# Patient Record
Sex: FEMALE | Race: WHITE | Employment: PART TIME | URBAN - METROPOLITAN AREA
[De-identification: names, ages, dates, MRNs, and addresses within clinical notes are randomized per-mention and may not be internally consistent; named-entity substitution may affect disease eponyms.]

---

## 2017-04-27 ENCOUNTER — TELEPHONE (OUTPATIENT)
Dept: SURGERY | Facility: CLINIC | Age: 62
End: 2017-04-27

## 2017-04-27 NOTE — TELEPHONE ENCOUNTER
Pt on prilosec 40mg BID and wants to know if something stronger is available. Informed pt that there is not, does not wish to try Nexium, but will try taking tums along with the prilosec.

## 2017-06-28 ENCOUNTER — TELEPHONE (OUTPATIENT)
Dept: HEMATOLOGY/ONCOLOGY | Facility: HOSPITAL | Age: 62
End: 2017-06-28

## 2017-06-28 NOTE — TELEPHONE ENCOUNTER
Patient states she is really nervous about her  possibly losing his job. This is not for certain. Advised patient to discuss with  about this.  If he is going to lose insurance coverage, will discuss with Dr Cyrus Ahuja regarding moving up appt and

## 2017-06-30 ENCOUNTER — TELEPHONE (OUTPATIENT)
Dept: HEMATOLOGY/ONCOLOGY | Facility: HOSPITAL | Age: 62
End: 2017-06-30

## 2017-06-30 DIAGNOSIS — K86.2 PANCREAS CYST: Primary | ICD-10-CM

## 2017-07-17 ENCOUNTER — TELEPHONE (OUTPATIENT)
Dept: SURGERY | Facility: CLINIC | Age: 62
End: 2017-07-17

## 2017-07-17 RX ORDER — OMEPRAZOLE 20 MG/1
20 CAPSULE, DELAYED RELEASE ORAL 2 TIMES DAILY
Qty: 60 CAPSULE | Refills: 6 | Status: SHIPPED | OUTPATIENT
Start: 2017-07-17 | End: 2017-07-27

## 2017-07-19 ENCOUNTER — NURSE ONLY (OUTPATIENT)
Dept: HEMATOLOGY/ONCOLOGY | Facility: HOSPITAL | Age: 62
End: 2017-07-19

## 2017-07-19 NOTE — PROGRESS NOTES
Requested CD on MRI of the abdomen done at [facility]. Spoke to [place]. Sent fax with info where CD needs to be mailed to, [phone].

## 2017-07-20 ENCOUNTER — OFFICE VISIT (OUTPATIENT)
Dept: HEMATOLOGY/ONCOLOGY | Age: 62
End: 2017-07-20
Attending: INTERNAL MEDICINE
Payer: COMMERCIAL

## 2017-07-20 VITALS
DIASTOLIC BLOOD PRESSURE: 76 MMHG | TEMPERATURE: 98 F | OXYGEN SATURATION: 99 % | HEART RATE: 74 BPM | RESPIRATION RATE: 16 BRPM | SYSTOLIC BLOOD PRESSURE: 111 MMHG

## 2017-07-20 DIAGNOSIS — Z85.3 HISTORY OF BREAST CANCER: Primary | ICD-10-CM

## 2017-07-20 DIAGNOSIS — K86.2 PANCREATIC CYST: ICD-10-CM

## 2017-07-20 PROCEDURE — 99213 OFFICE O/P EST LOW 20 MIN: CPT | Performed by: INTERNAL MEDICINE

## 2017-07-20 RX ORDER — FLUTICASONE PROPIONATE 50 MCG
SPRAY, SUSPENSION (ML) NASAL
Refills: 5 | COMMUNITY
Start: 2017-07-02

## 2017-07-20 RX ORDER — FERROUS SULFATE 325(65) MG
325 TABLET ORAL
COMMUNITY
End: 2019-11-21 | Stop reason: ALTCHOICE

## 2017-07-20 RX ORDER — TRAMADOL HYDROCHLORIDE 50 MG/1
TABLET ORAL
Refills: 1 | COMMUNITY
Start: 2017-07-02 | End: 2017-10-09

## 2017-07-20 RX ORDER — OMEPRAZOLE 40 MG/1
CAPSULE, DELAYED RELEASE ORAL
Refills: 6 | COMMUNITY
Start: 2017-07-17 | End: 2017-10-09

## 2017-07-20 RX ORDER — NABUMETONE 750 MG/1
TABLET, FILM COATED ORAL
Refills: 1 | COMMUNITY
Start: 2017-07-13 | End: 2017-10-09

## 2017-07-20 NOTE — PROGRESS NOTES
Patient is here for MD f/u for breast cancer. Pt had an MRI of the abdomen on July 12. C/o left hip pain d/t bursitis. Pt started on Iron as ordered by PCP. Appetite and energy level is good.          Education Record    Learner:  Patient    Disease / Ruth Hedrick

## 2017-07-21 NOTE — PROGRESS NOTES
[facility]    PATIENT'S NAME: Marjorie Salas   ATTENDING PHYSICIAN: Ban Zurita M.D.    PATIENT ACCOUNT #: [de-identified] LOCATION: [address] [place] [address] RECORD #: [id number] YOB: 1955   DATE OF SERVICE: 07/20/2017       CANCER C status is 0. Her weight is 102 pounds. Blood pressure is 111/76, pulse 74, respiratory rate 20, temperature 98. 2. HEENT:  Unremarkable. She has pink conjunctivae, anicteric sclerae. Pharynx without lesions.   LYMPHATICS:  No cervical, supraclavicular,

## 2017-07-28 ENCOUNTER — APPOINTMENT (OUTPATIENT)
Dept: LAB | Age: 62
End: 2017-07-28
Payer: COMMERCIAL

## 2017-07-28 DIAGNOSIS — K86.2 PANCREATIC CYST: ICD-10-CM

## 2017-07-28 LAB
CHLORIDE: 108 MMOL/L (ref 101–111)
CO2: 25 MMOL/L (ref 22–32)
POTASSIUM SERPL-SCNC: 4.1 MMOL/L (ref 3.6–5.1)
SODIUM SERPL-SCNC: 142 MMOL/L (ref 136–144)

## 2017-07-28 PROCEDURE — 36415 COLL VENOUS BLD VENIPUNCTURE: CPT

## 2017-07-28 PROCEDURE — 80051 ELECTROLYTE PANEL: CPT

## 2017-08-08 ENCOUNTER — ANESTHESIA EVENT (OUTPATIENT)
Dept: ENDOSCOPY | Facility: HOSPITAL | Age: 62
End: 2017-08-08
Payer: COMMERCIAL

## 2017-08-09 ENCOUNTER — SURGERY (OUTPATIENT)
Age: 62
End: 2017-08-09

## 2017-08-09 ENCOUNTER — ANESTHESIA (OUTPATIENT)
Dept: ENDOSCOPY | Facility: HOSPITAL | Age: 62
End: 2017-08-09
Payer: COMMERCIAL

## 2017-08-09 ENCOUNTER — HOSPITAL ENCOUNTER (OUTPATIENT)
Facility: HOSPITAL | Age: 62
Setting detail: HOSPITAL OUTPATIENT SURGERY
Discharge: HOME | End: 2017-08-09
Attending: INTERNAL MEDICINE | Admitting: INTERNAL MEDICINE
Payer: COMMERCIAL

## 2017-08-09 VITALS
OXYGEN SATURATION: 97 % | DIASTOLIC BLOOD PRESSURE: 78 MMHG | SYSTOLIC BLOOD PRESSURE: 114 MMHG | HEART RATE: 58 BPM | WEIGHT: 104 LBS | BODY MASS INDEX: 19.14 KG/M2 | TEMPERATURE: 98 F | HEIGHT: 62 IN | RESPIRATION RATE: 16 BRPM

## 2017-08-09 DIAGNOSIS — K86.2 PANCREATIC CYST: Primary | ICD-10-CM

## 2017-08-09 PROCEDURE — 82150 ASSAY OF AMYLASE: CPT | Performed by: INTERNAL MEDICINE

## 2017-08-09 PROCEDURE — 82378 CARCINOEMBRYONIC ANTIGEN: CPT | Performed by: INTERNAL MEDICINE

## 2017-08-09 PROCEDURE — 88173 CYTOPATH EVAL FNA REPORT: CPT | Performed by: INTERNAL MEDICINE

## 2017-08-09 PROCEDURE — 0D988ZX DRAINAGE OF SMALL INTESTINE, VIA NATURAL OR ARTIFICIAL OPENING ENDOSCOPIC, DIAGNOSTIC: ICD-10-PCS | Performed by: INTERNAL MEDICINE

## 2017-08-09 RX ORDER — SODIUM CHLORIDE, SODIUM LACTATE, POTASSIUM CHLORIDE, CALCIUM CHLORIDE 600; 310; 30; 20 MG/100ML; MG/100ML; MG/100ML; MG/100ML
INJECTION, SOLUTION INTRAVENOUS CONTINUOUS
Status: DISCONTINUED | OUTPATIENT
Start: 2017-08-09 | End: 2017-08-09

## 2017-08-09 RX ORDER — CIPROFLOXACIN 2 MG/ML
400 INJECTION, SOLUTION INTRAVENOUS ONCE
Status: DISCONTINUED | OUTPATIENT
Start: 2017-08-09 | End: 2017-08-09

## 2017-08-09 RX ORDER — LEVOFLOXACIN 5 MG/ML
INJECTION, SOLUTION INTRAVENOUS
Status: DISCONTINUED
Start: 2017-08-09 | End: 2017-08-09

## 2017-08-09 RX ORDER — NALOXONE HYDROCHLORIDE 0.4 MG/ML
80 INJECTION, SOLUTION INTRAMUSCULAR; INTRAVENOUS; SUBCUTANEOUS AS NEEDED
Status: DISCONTINUED | OUTPATIENT
Start: 2017-08-09 | End: 2017-08-09

## 2017-08-09 RX ORDER — TURMERIC ROOT EXTRACT 500 MG
TABLET ORAL DAILY
COMMUNITY

## 2017-08-09 NOTE — INTERVAL H&P NOTE
Pre-op Diagnosis: PANCREATIC CYST    The above referenced H&P was reviewed by Mitchel Marcano DO on 8/9/2017, the patient was examined and no significant changes have occurred in the patient's condition since the H&P was performed.   I discussed with the p

## 2017-08-09 NOTE — ANESTHESIA POSTPROCEDURE EVALUATION
[address] Patient Status:  Hospital Outpatient Surgery   Age/Gender 64year old female MRN [id number]   [facility] ENDOSCOPY Attending 3 Titi Berkowitz, [address] Day # 0 PCP GUILLE NAJERA       Anesthesia Post-op No

## 2017-08-09 NOTE — ANESTHESIA PREPROCEDURE EVALUATION
PRE-OP EVALUATION    Patient Name: Kristy Bright    Pre-op Diagnosis: PANCREATIC CYST    Procedure(s):  ENDOSCOPIC ULTRASOUND     Surgeon(s) and Role:     * Basil Raya, DO - Primary    Pre-op vitals reviewed.   Temp: 97.6 °F (36.4 °C)  Pulse: 52 use Yes    Comment: rarely, 4x/year       Drug use: No     Available pre-op labs reviewed.        Lab Results  Component Value Date    07/28/2017   K 4.1 07/28/2017    07/28/2017   CO2 25.0 07/28/2017            Airway      Mallampati: II  Mouth

## 2017-08-09 NOTE — OPERATIVE REPORT
`      Upper Endoscopic Ultrasound with Fine Needle Aspiration    Hernando Alexander Patient Status:  Hospital Outpatient Surgery    1955 MRN [id number]   [facility] ENDOSCOPY Attending Arthur Bravo,    Hosp Day # 0 PCP SHEREE condition. ENDOSONOGRAPHIC FINDINGS:    The exam began with a linear echoendoscope. The parenchyma of the pancreas revealed hyperechoic stranding and lobularity suggestive of chronic pancreatitis.  A 7 mm anechoic cyst was visualized in the uncinate pr

## 2017-10-04 ENCOUNTER — APPOINTMENT (OUTPATIENT)
Dept: LAB | Age: 62
End: 2017-10-04
Attending: INTERNAL MEDICINE
Payer: COMMERCIAL

## 2017-10-04 DIAGNOSIS — K86.2 PANCREAS CYST: ICD-10-CM

## 2017-10-04 PROCEDURE — 36415 COLL VENOUS BLD VENIPUNCTURE: CPT

## 2017-10-04 PROCEDURE — 80076 HEPATIC FUNCTION PANEL: CPT

## 2017-10-09 ENCOUNTER — OFFICE VISIT (OUTPATIENT)
Dept: SURGERY | Facility: CLINIC | Age: 62
End: 2017-10-09

## 2017-10-09 VITALS
HEIGHT: 62.5 IN | DIASTOLIC BLOOD PRESSURE: 68 MMHG | WEIGHT: 104 LBS | SYSTOLIC BLOOD PRESSURE: 100 MMHG | RESPIRATION RATE: 16 BRPM | BODY MASS INDEX: 18.66 KG/M2 | HEART RATE: 68 BPM

## 2017-10-09 DIAGNOSIS — K21.9 GASTROESOPHAGEAL REFLUX DISEASE WITHOUT ESOPHAGITIS: Primary | ICD-10-CM

## 2017-10-09 PROCEDURE — 99213 OFFICE O/P EST LOW 20 MIN: CPT | Performed by: SURGERY

## 2017-10-09 RX ORDER — OMEPRAZOLE 40 MG/1
40 CAPSULE, DELAYED RELEASE ORAL DAILY
Qty: 60 CAPSULE | Refills: 6 | Status: ON HOLD | OUTPATIENT
Start: 2017-10-09 | End: 2019-09-18

## 2017-10-09 NOTE — PROGRESS NOTES
Follow Up Visit Note       Active Problems      1. Gastroesophageal reflux disease without esophagitis          Chief Complaint   Patient presents with:  Gastro-esophageal Reflux: 1 year f/u        History of Present Illness    Doing well.   Reports very ra IEN D Disp:  Rfl: 5   Ferrous Sulfate 325 (65 Fe) MG Oral Tab Take 325 mg by mouth 2 (two) times daily. Disp:  Rfl:    Potassium Chloride ER (K-DUR M20) 20 MEQ Oral Tab CR Take 20 mEq by mouth 2 (two) times daily.  Disp:  Rfl:    GuaiFENesin ER ([address]) 60 reviewed. Assessment   Gastroesophageal reflux disease without esophagitis  (primary encounter diagnosis)    Pt with GERD. Symptom free on bid PPI (omeprazole). Plan   · Lengthy discussion with the patient.   · Recommend patient change to alda

## 2019-02-12 ENCOUNTER — OFFICE VISIT (OUTPATIENT)
Dept: SURGERY | Facility: CLINIC | Age: 64
End: 2019-02-12
Payer: COMMERCIAL

## 2019-02-12 VITALS
TEMPERATURE: 97 F | DIASTOLIC BLOOD PRESSURE: 63 MMHG | WEIGHT: 101 LBS | HEIGHT: 62.5 IN | BODY MASS INDEX: 18.12 KG/M2 | HEART RATE: 56 BPM | SYSTOLIC BLOOD PRESSURE: 109 MMHG

## 2019-02-12 VITALS
BODY MASS INDEX: 18.12 KG/M2 | DIASTOLIC BLOOD PRESSURE: 63 MMHG | HEIGHT: 62.5 IN | SYSTOLIC BLOOD PRESSURE: 109 MMHG | HEART RATE: 56 BPM | WEIGHT: 101 LBS | TEMPERATURE: 97 F

## 2019-02-12 DIAGNOSIS — I42.8 NONISCHEMIC CARDIOMYOPATHY (HCC): ICD-10-CM

## 2019-02-12 DIAGNOSIS — K21.9 GASTROESOPHAGEAL REFLUX DISEASE WITHOUT ESOPHAGITIS: ICD-10-CM

## 2019-02-12 DIAGNOSIS — K86.2 PANCREATIC CYST: ICD-10-CM

## 2019-02-12 DIAGNOSIS — K62.3 RECTAL PROLAPSE: Primary | ICD-10-CM

## 2019-02-12 PROCEDURE — 99215 OFFICE O/P EST HI 40 MIN: CPT | Performed by: COLON & RECTAL SURGERY

## 2019-02-12 PROCEDURE — 99213 OFFICE O/P EST LOW 20 MIN: CPT | Performed by: SURGERY

## 2019-02-12 NOTE — H&P
New Patient Visit Note       Active Problems      No diagnosis found. Chief Complaint   Patient presents with:  Anal Problem (GI): [facility] PT ref by [facility] for prolapsed rectum.  c/o of bleeding and PN for several days/      History of Present Illness Take 100 mg by mouth daily. , Disp: , Rfl:   •  Cyanocobalamin (VITAMIN B-12 OR), Take by mouth daily. , Disp: , Rfl:   •  Omeprazole 40 MG Oral Capsule Delayed Release, Take 1 capsule (40 mg total) by mouth daily. , Disp: 60 capsule, Rfl: 6  •  Turmeric 500 Up  No Follow-up on file.     Sheba Muñiz MD

## 2019-02-12 NOTE — H&P
New Patient Visit Note       Active Problems      1. Rectal prolapse        Chief Complaint   Patient presents with:  Anal / Rectal Problem: NEW PT, REFERRED BY  [address] PT C/O BLEEDING SINCE YESTERDAY AND PAIN.       History of Present Illnes Tartrate 25 MG Oral Tab, Take 25 mg by mouth 2 (two) times daily. , Disp: , Rfl:   •  aspirin 81 MG Oral Tab, Take 81 mg by mouth daily. , Disp: , Rfl:   •  losartan 100 MG Oral Tab, Take 100 mg by mouth daily. , Disp: , Rfl:   •  Cyanocobalamin (VITAMIN B-12 (36.3 °C) (Oral)   Ht 62.5\"   Wt 101 lb   BMI 18.18 kg/m²   Physical Exam   Constitutional: She appears well-developed and well-nourished. No distress. Cardiovascular: Normal rate and regular rhythm.    Pulmonary/Chest: Effort normal. No respiratory dist

## 2019-02-13 NOTE — PATIENT INSTRUCTIONS
Assessment   Rectal prolapse  (primary encounter diagnosis)  Gastroesophageal reflux disease without esophagitis  Pancreatic cyst      Plan   The patient has a full thickness rectal prolapse. This was reduced at the bedside.  There was no ischemia or tissue

## 2019-02-13 NOTE — H&P
New Patient Visit Note       Active Problems      1. Rectal prolapse    2. Gastroesophageal reflux disease without esophagitis    3.  Pancreatic cyst        Chief Complaint   Patient presents with:  Anal Problem (GI): anal pain and bleeding      History of have a cystocele. The patient does have prolapse of the vagina or cervix.     The patient states she has been recommended to have her vaginal prolapse repaired, but she has put this off as it does not bother her, and as she is trying to improve her cardiac on File Prior to Visit:  metoprolol Tartrate 25 MG Oral Tab Take 25 mg by mouth 2 (two) times daily. aspirin 81 MG Oral Tab Take 81 mg by mouth daily. losartan 100 MG Oral Tab Take 100 mg by mouth daily.    Cyanocobalamin (VITAMIN B-12 OR) Take by mouth appears well-developed and well-nourished. No distress. HENT:   Head: Normocephalic and atraumatic. Nose: Nose normal.   Eyes: EOM are normal. Pupils are equal, round, and reactive to light. No scleral icterus. Neck: Normal range of motion.    Cardiov her seemingly significant organ prolapse via the vagina, concurrent anterior and posterior repair should be considered. This is something that could theoretically be arranged here at THE [facility], however I also suggest an opinion from a higher volume center.  I

## 2019-02-25 ENCOUNTER — HOSPITAL ENCOUNTER (OUTPATIENT)
Dept: MRI IMAGING | Facility: HOSPITAL | Age: 64
Discharge: HOME | End: 2019-02-25
Attending: INTERNAL MEDICINE
Payer: COMMERCIAL

## 2019-02-25 DIAGNOSIS — K86.2 PANCREAS CYST: ICD-10-CM

## 2019-02-25 PROCEDURE — 74183 MRI ABD W/O CNTR FLWD CNTR: CPT | Performed by: INTERNAL MEDICINE

## 2019-02-25 PROCEDURE — A9575 INJ GADOTERATE MEGLUMI 0.1ML: HCPCS

## 2019-03-12 ENCOUNTER — LAB ENCOUNTER (OUTPATIENT)
Dept: LAB | Age: 64
End: 2019-03-12
Attending: INTERNAL MEDICINE
Payer: COMMERCIAL

## 2019-03-12 DIAGNOSIS — R14.0 ABDOMINAL BLOATING: ICD-10-CM

## 2019-03-12 LAB
ALBUMIN SERPL-MCNC: 3.1 G/DL (ref 3.4–5)
ALBUMIN/GLOB SERPL: 1.4 {RATIO} (ref 1–2)
ALP LIVER SERPL-CCNC: 45 U/L (ref 50–130)
ALT SERPL-CCNC: 27 U/L (ref 13–56)
ANION GAP SERPL CALC-SCNC: 6 MMOL/L (ref 0–18)
AST SERPL-CCNC: 22 U/L (ref 15–37)
BASOPHILS # BLD AUTO: 0.02 X10(3) UL (ref 0–0.2)
BASOPHILS NFR BLD AUTO: 0.3 %
BILIRUB SERPL-MCNC: 0.4 MG/DL (ref 0.1–2)
BUN BLD-MCNC: 27 MG/DL (ref 7–18)
BUN/CREAT SERPL: 36 (ref 10–20)
CALCIUM BLD-MCNC: 8.7 MG/DL (ref 8.5–10.1)
CHLORIDE SERPL-SCNC: 109 MMOL/L (ref 98–107)
CO2 SERPL-SCNC: 27 MMOL/L (ref 21–32)
CREAT BLD-MCNC: 0.75 MG/DL (ref 0.55–1.02)
CRP SERPL-MCNC: <0.29 MG/DL (ref ?–0.3)
DEPRECATED RDW RBC AUTO: 48.4 FL (ref 35.1–46.3)
EOSINOPHIL # BLD AUTO: 0.31 X10(3) UL (ref 0–0.7)
EOSINOPHIL NFR BLD AUTO: 4.1 %
ERYTHROCYTE [DISTWIDTH] IN BLOOD BY AUTOMATED COUNT: 13.5 % (ref 11–15)
GLOBULIN PLAS-MCNC: 2.2 G/DL (ref 2.8–4.4)
GLUCOSE BLD-MCNC: 103 MG/DL (ref 70–99)
HCT VFR BLD AUTO: 33.1 % (ref 35–48)
HGB BLD-MCNC: 10.5 G/DL (ref 12–16)
IMM GRANULOCYTES # BLD AUTO: 0.04 X10(3) UL (ref 0–1)
IMM GRANULOCYTES NFR BLD: 0.5 %
LIPASE SERPL-CCNC: 135 U/L (ref 73–393)
LYMPHOCYTES # BLD AUTO: 1.4 X10(3) UL (ref 1–4)
LYMPHOCYTES NFR BLD AUTO: 18.7 %
M PROTEIN MFR SERPL ELPH: 5.3 G/DL (ref 6.4–8.2)
MCH RBC QN AUTO: 30.8 PG (ref 26–34)
MCHC RBC AUTO-ENTMCNC: 31.7 G/DL (ref 31–37)
MCV RBC AUTO: 97.1 FL (ref 80–100)
MONOCYTES # BLD AUTO: 0.57 X10(3) UL (ref 0.1–1)
MONOCYTES NFR BLD AUTO: 7.6 %
NEUTROPHILS # BLD AUTO: 5.14 X10 (3) UL (ref 1.5–7.7)
NEUTROPHILS # BLD AUTO: 5.14 X10(3) UL (ref 1.5–7.7)
NEUTROPHILS NFR BLD AUTO: 68.8 %
OSMOLALITY SERPL CALC.SUM OF ELEC: 299 MOSM/KG (ref 275–295)
PLATELET # BLD AUTO: 223 10(3)UL (ref 150–450)
POTASSIUM SERPL-SCNC: 4.2 MMOL/L (ref 3.5–5.1)
RBC # BLD AUTO: 3.41 X10(6)UL (ref 3.8–5.3)
SODIUM SERPL-SCNC: 142 MMOL/L (ref 136–145)
WBC # BLD AUTO: 7.5 X10(3) UL (ref 4–11)

## 2019-03-12 PROCEDURE — 36415 COLL VENOUS BLD VENIPUNCTURE: CPT

## 2019-03-12 PROCEDURE — 85025 COMPLETE CBC W/AUTO DIFF WBC: CPT

## 2019-03-12 PROCEDURE — 80053 COMPREHEN METABOLIC PANEL: CPT

## 2019-03-12 PROCEDURE — 86140 C-REACTIVE PROTEIN: CPT

## 2019-03-12 PROCEDURE — 83690 ASSAY OF LIPASE: CPT

## 2019-03-25 ENCOUNTER — APPOINTMENT (OUTPATIENT)
Dept: LAB | Age: 64
End: 2019-03-25
Attending: INTERNAL MEDICINE
Payer: COMMERCIAL

## 2019-03-25 DIAGNOSIS — R10.31 BILATERAL LOWER ABDOMINAL DISCOMFORT: ICD-10-CM

## 2019-03-25 DIAGNOSIS — R10.32 BILATERAL LOWER ABDOMINAL DISCOMFORT: ICD-10-CM

## 2019-03-25 DIAGNOSIS — D50.8 OTHER IRON DEFICIENCY ANEMIA: ICD-10-CM

## 2019-03-25 LAB
DEPRECATED HBV CORE AB SER IA-ACNC: 18.6 NG/ML (ref 18–340)
IRON SATURATION: 45 % (ref 15–50)
IRON SERPL-MCNC: 181 UG/DL (ref 50–170)
TOTAL IRON BINDING CAPACITY: 402 UG/DL (ref 240–450)
TRANSFERRIN SERPL-MCNC: 270 MG/DL (ref 200–360)

## 2019-03-25 PROCEDURE — 83540 ASSAY OF IRON: CPT

## 2019-03-25 PROCEDURE — 86304 IMMUNOASSAY TUMOR CA 125: CPT

## 2019-03-25 PROCEDURE — 82728 ASSAY OF FERRITIN: CPT

## 2019-03-25 PROCEDURE — 86301 IMMUNOASSAY TUMOR CA 19-9: CPT

## 2019-03-25 PROCEDURE — 82378 CARCINOEMBRYONIC ANTIGEN: CPT

## 2019-03-25 PROCEDURE — 83550 IRON BINDING TEST: CPT

## 2019-03-25 PROCEDURE — 36415 COLL VENOUS BLD VENIPUNCTURE: CPT

## 2019-03-26 LAB
CANCER AG125 SERPL-ACNC: 7.4 U/ML (ref ?–35)
CANCER AG19-9 SERPL-ACNC: 14.6 U/ML (ref ?–37)
CEA SERPL-MCNC: 1.7 NG/ML (ref ?–5)

## 2019-07-29 ENCOUNTER — TELEPHONE (OUTPATIENT)
Dept: HEMATOLOGY/ONCOLOGY | Facility: HOSPITAL | Age: 64
End: 2019-07-29

## 2019-08-01 ENCOUNTER — OFFICE VISIT (OUTPATIENT)
Dept: HEMATOLOGY/ONCOLOGY | Age: 64
End: 2019-08-01
Attending: INTERNAL MEDICINE
Payer: COMMERCIAL

## 2019-08-01 VITALS
DIASTOLIC BLOOD PRESSURE: 61 MMHG | SYSTOLIC BLOOD PRESSURE: 107 MMHG | WEIGHT: 108.13 LBS | BODY MASS INDEX: 20 KG/M2 | RESPIRATION RATE: 16 BRPM | HEART RATE: 64 BPM | OXYGEN SATURATION: 92 % | TEMPERATURE: 98 F

## 2019-08-01 DIAGNOSIS — D50.0 IRON DEFICIENCY ANEMIA DUE TO CHRONIC BLOOD LOSS: Primary | ICD-10-CM

## 2019-08-01 DIAGNOSIS — M81.0 OSTEOPOROSIS OF MULTIPLE SITES: ICD-10-CM

## 2019-08-01 DIAGNOSIS — Z85.3 HISTORY OF BREAST CANCER: ICD-10-CM

## 2019-08-01 PROCEDURE — 99214 OFFICE O/P EST MOD 30 MIN: CPT | Performed by: INTERNAL MEDICINE

## 2019-08-01 NOTE — PATIENT INSTRUCTIONS
For Dr. Nafisa Maldonado nurse line, call [phone] with any questions or concerns,  Monday through Friday 8:00 to 4:30.      After hours or weekends for urgent needs: [phone].   Central [phone]  Medical Records:   [phone]  Cancer [facility]

## 2019-08-01 NOTE — PROGRESS NOTES
Understanding a Distal Radius Fracture      A fracture is a broken bone. A fracture in the distal radius is a break in the lower end of the radius. This is the larger bone in the forearm. Because the break occurs near the wrist, it is often called a wrist fracture.    The bone may be cracked, or it may be broken into 2 or more pieces. The pieces of bone may be lined up or they may have moved out of place. Sometimes, the bone may break through the skin. Nearby nerves, tissues, and joints also may be damaged. Depending on the severity of the fracture, healing may take several months or longer.  What causes a distal radius fracture?  This type of fracture is most often caused from a fall on an outstretched hand. It can also be caused from a blow, accident, or sports injury.  Symptoms of a distal radius fracture  Symptoms can include pain, swelling, and bruising. If the bone breaks through the skin, external bleeding can also occur. The wrist may look crooked, deformed, or bent. It may be hard to move or use the arm, wrist, and hand for normal tasks and activities.  Treating a distal radius fracture  Treatment depends on how serious the fracture is. If needed, the bone is put back into place. This may be done with or without surgery. If surgery is needed, the surgeon may use devices such as pins, plates, or screws to hold the bone together. You may need to wear a splint or cast for a month or longer to protect the bone and keep it in place during healing. Other treatments may be also used to help reduce symptoms or regain function. These include:    Cold packs. Putting an ice pack on the injured area may help reduce swelling and pain.    Raising the arm and wrist. Keeping the arm and wrist raised above heart level may help reduce swelling.    Pain medicines. Taking prescription or over-the-counter pain medicines may help reduce pain and swelling.    Exercises. Doing certain exercises at home or with a physical  Pt here for MD visit for anemia. Referred by PCP. Pt currently take iron supplement TID with no complaints. Does report occasional rectal bleeding.      Education Record    Learner:  Patient    Disease / [place] Needs    Barriers / Limitations:  None   C therapist can help restore strength, flexibility, and range of motion in your arm, wrist, and hand. In general, exercises are not started until after the splint or cast is removed.  Possible complications of a distal radius fracture  These can include:    Poor healing of the bone    Weakness, stiffness, or loss of range of motion in the arm, wrist, or hand    Osteoarthritis in the wrist joint  When to call your healthcare provider  Call your healthcare provider right away if you have any of these:    Fever of 100.4 F (38 C) or higher, or as directed    Symptoms that don t get better with treatment, or get worse    Numbness, coldness, or swelling in your arm, hand, or fingers    Fingernails that turn blue or gray in color    A splint or cast that is damaged or feels too tight or loose    New symptoms   Date Last Reviewed: 3/10/2016    0892-0439 [employer]. [address] [place] [address]. All rights reserved. This information is not intended as a substitute for professional medical care. Always follow your healthcare professional's instructions.        Discharge Instructions: Caring for Your Splint  You will be going home with a splint. This is sometimes called a removable cast. A splint helps your body heal by holding your injured bones or joints in place. Take good care of your splint. A damaged splint can keep your injury from healing well. If your splint becomes damaged or loses its shape, you may need to replace it.   You have a broken ___________________ bone.  This bone is located in your ____________.   Home care    Wear your splint according to your doctor s instructions.    Keep the splint dry at all times. Bathe with your splint well out of the water. You can hold the splint outside the tub or shower when bathing. Protect it with a large plastic bag closed at the top end with a rubber band. Use two layers of plastic to help keep the splint dry. Or you can buy a waterproof  shield.    If a splint gets wet, dry it with a hair dryer on the  cool  setting. Don t use the warm or hot setting, because those settings can burn your skin.    Always keep the splint clean and away from dirt.    Wash the Velcro straps and inner cloth sleeve (stockinet) with soapy water and air dry.    Keep your splint away from open flames.    Don t expose your splint to heat, space heaters, or prolonged sunlight. Excessive heat will cause the splint to change shape.    Don t cut or tear the splint.     Exercise all the nearby joints not kept still by the splint. If you have a long leg splint, exercise your hip joint and your toes. If you have an arm splint, exercise your shoulder, elbow, thumb, and fingers.    Elevate the part of your body that is in the splint. This helps reduce swelling.  Follow-up care  Make a follow-up appointment with your healthcare provider, or as advised.  When to call your healthcare provider  Call your healthcare provider right away if you have any of these:    Tingling or numbness in the affected area    Severe pain that cannot be relieved with medicine    Cast that feels too tight or too loose    Swelling, coldness, or blue-gray color in the fingers or toes    Cast that is damaged, cracked, or has rough edges that hurt    Pressure sores or red marks that don t go away within 1 hour after removing the splint    Blisters   Date Last Reviewed: 7/1/2016 2000-2017 [employer]. [address] [place] [address]. All rights reserved. This information is not intended as a substitute for professional medical care. Always follow your healthcare professional's instructions.      Opioid Medication Information    You have been given a prescription for an opioid (narcotic) pain medicine and/or have received a pain medicine while here in the Emergency Department. These medicines can make you drowsy or impaired. You must not drive, operate dangerous equipment, or engage in  any other dangerous activities while taking these medications. If you drive while taking these medications, you could be arrested for DUI, or driving under the influence. Do not drink any alcohol while you are taking these medications.   Opioid pain medications can cause addiction. If you have a history of chemical dependency of any type, you are at a higher risk of becoming addicted to pain medications.  Only take these prescribed medications to treat your pain when all other options have been tried. Take it for as short a time and as few doses as possible. Store your pain pills in a secure place, as they are frequently stolen and provide a dangerous opportunity for children or visitors in your house to start abusing these powerful medications. We will not replace any lost or stolen medicine.  As soon as your pain is better, you should flush all your remaining medication.   Many prescription pain medications contain Tylenol  (acetaminophen), including Vicodin , Tylenol #3 , Norco , Lortab , and Percocet .  You should not take any extra pills of Tylenol  if you are using these prescription medications or you can get very sick.  Do not ever take more than 4000 mg of acetaminophen in any 24 hour period.  All opioids tend to cause constipation. Drink plenty of water and eat foods that have a lot of fiber, such as fruits, vegetables, prune juice, apple juice and high fiber cereal.  Take a laxative if you don t move your bowels at least every other day. Miralax , Milk of Magnesia, Colace , or Senna  can be used to keep you regular.

## 2019-08-02 NOTE — PROGRESS NOTES
[facility]    PATIENT'S NAME: Reema Pool   ATTENDING PHYSICIAN: Olivier Navarrete M.D.    PATIENT ACCOUNT #: [de-identified] LOCATION: [address] [place] [address] RECORD #: [id number] YOB: 1955   DATE OF SERVICE: 08/01/2019       CANCER C and burning in her esophagus. She has not had an upper endoscopy in 4 to 5 years. She did see Dr. Dante Hankins earlier this year but was more asymptomatic at that time. She has also felt more fatigued recently.   She was active and back to running once her c 13.9.  Her reticulocyte count is just over normal at 2.8. Her ferritin is 12, their lower limit of normal is 11, but her iron saturation is reportedly 62%. Folic acid was elevated. B12 level was elevated. IMPRESSION:    1. Iron deficiency.   Despite

## 2019-08-05 ENCOUNTER — OFFICE VISIT (OUTPATIENT)
Dept: HEMATOLOGY/ONCOLOGY | Age: 64
End: 2019-08-05
Attending: INTERNAL MEDICINE
Payer: COMMERCIAL

## 2019-08-05 VITALS
TEMPERATURE: 99 F | SYSTOLIC BLOOD PRESSURE: 117 MMHG | HEART RATE: 60 BPM | OXYGEN SATURATION: 97 % | RESPIRATION RATE: 16 BRPM | DIASTOLIC BLOOD PRESSURE: 61 MMHG

## 2019-08-05 DIAGNOSIS — D50.0 IRON DEFICIENCY ANEMIA DUE TO CHRONIC BLOOD LOSS: Primary | ICD-10-CM

## 2019-08-05 PROCEDURE — 96376 TX/PRO/DX INJ SAME DRUG ADON: CPT

## 2019-08-05 PROCEDURE — 96365 THER/PROPH/DIAG IV INF INIT: CPT

## 2019-08-07 ENCOUNTER — APPOINTMENT (OUTPATIENT)
Dept: LAB | Age: 64
End: 2019-08-07
Payer: COMMERCIAL

## 2019-08-07 DIAGNOSIS — D50.0 IRON DEFICIENCY ANEMIA DUE TO CHRONIC BLOOD LOSS: ICD-10-CM

## 2019-08-07 LAB
CHLORIDE SERPL-SCNC: 108 MMOL/L (ref 98–112)
CO2 SERPL-SCNC: 27 MMOL/L (ref 21–32)
POTASSIUM SERPL-SCNC: 4.8 MMOL/L (ref 3.5–5.1)
SODIUM SERPL-SCNC: 139 MMOL/L (ref 136–145)

## 2019-08-07 PROCEDURE — 80051 ELECTROLYTE PANEL: CPT

## 2019-08-07 PROCEDURE — 36415 COLL VENOUS BLD VENIPUNCTURE: CPT

## 2019-08-08 ENCOUNTER — APPOINTMENT (OUTPATIENT)
Dept: HEMATOLOGY/ONCOLOGY | Age: 64
End: 2019-08-08
Attending: INTERNAL MEDICINE
Payer: COMMERCIAL

## 2019-08-29 ENCOUNTER — APPOINTMENT (OUTPATIENT)
Dept: HEMATOLOGY/ONCOLOGY | Age: 64
End: 2019-08-29
Attending: INTERNAL MEDICINE
Payer: COMMERCIAL

## 2019-08-29 ENCOUNTER — OFFICE VISIT (OUTPATIENT)
Dept: HEMATOLOGY/ONCOLOGY | Age: 64
End: 2019-08-29
Attending: INTERNAL MEDICINE
Payer: COMMERCIAL

## 2019-08-29 DIAGNOSIS — D50.0 IRON DEFICIENCY ANEMIA DUE TO CHRONIC BLOOD LOSS: Primary | ICD-10-CM

## 2019-08-29 LAB
BASOPHILS # BLD AUTO: 0.03 X10(3) UL (ref 0–0.2)
BASOPHILS NFR BLD AUTO: 0.6 %
DEPRECATED HBV CORE AB SER IA-ACNC: 114.4 NG/ML (ref 18–340)
DEPRECATED RDW RBC AUTO: 47.5 FL (ref 35.1–46.3)
EOSINOPHIL # BLD AUTO: 0.27 X10(3) UL (ref 0–0.7)
EOSINOPHIL NFR BLD AUTO: 5 %
ERYTHROCYTE [DISTWIDTH] IN BLOOD BY AUTOMATED COUNT: 13.6 % (ref 11–15)
HCT VFR BLD AUTO: 31.5 % (ref 35–48)
HGB BLD-MCNC: 10.2 G/DL (ref 12–16)
IMM GRANULOCYTES # BLD AUTO: 0.03 X10(3) UL (ref 0–1)
IMM GRANULOCYTES NFR BLD: 0.6 %
IRON SATURATION: 15 % (ref 15–50)
IRON SERPL-MCNC: 51 UG/DL (ref 50–170)
LYMPHOCYTES # BLD AUTO: 1.3 X10(3) UL (ref 1–4)
LYMPHOCYTES NFR BLD AUTO: 23.9 %
MCH RBC QN AUTO: 30.9 PG (ref 26–34)
MCHC RBC AUTO-ENTMCNC: 32.4 G/DL (ref 31–37)
MCV RBC AUTO: 95.5 FL (ref 80–100)
MONOCYTES # BLD AUTO: 0.47 X10(3) UL (ref 0.1–1)
MONOCYTES NFR BLD AUTO: 8.7 %
NEUTROPHILS # BLD AUTO: 3.33 X10 (3) UL (ref 1.5–7.7)
NEUTROPHILS # BLD AUTO: 3.33 X10(3) UL (ref 1.5–7.7)
NEUTROPHILS NFR BLD AUTO: 61.2 %
PLATELET # BLD AUTO: 202 10(3)UL (ref 150–450)
RBC # BLD AUTO: 3.3 X10(6)UL (ref 3.8–5.3)
TOTAL IRON BINDING CAPACITY: 335 UG/DL (ref 240–450)
TRANSFERRIN SERPL-MCNC: 225 MG/DL (ref 200–360)
WBC # BLD AUTO: 5.4 X10(3) UL (ref 4–11)

## 2019-08-29 PROCEDURE — 83550 IRON BINDING TEST: CPT

## 2019-08-29 PROCEDURE — 82728 ASSAY OF FERRITIN: CPT

## 2019-08-29 PROCEDURE — 36415 COLL VENOUS BLD VENIPUNCTURE: CPT

## 2019-08-29 PROCEDURE — 85025 COMPLETE CBC W/AUTO DIFF WBC: CPT

## 2019-08-29 PROCEDURE — 83540 ASSAY OF IRON: CPT

## 2019-09-06 ENCOUNTER — TELEPHONE (OUTPATIENT)
Dept: HEMATOLOGY/ONCOLOGY | Facility: HOSPITAL | Age: 64
End: 2019-09-06

## 2019-09-06 NOTE — TELEPHONE ENCOUNTER
Test(s) completed: Iron Studies and CBC  Results: iron levels a little better but hgb still about the same  Plan: Per Dr Juli Reeves patient to get repeat labs in 4 weeks and see MD a few days later. Appt scheduled for patient.  She is also scheduled for a colon

## 2019-09-09 ENCOUNTER — LABORATORY ENCOUNTER (OUTPATIENT)
Dept: LAB | Age: 64
End: 2019-09-09
Payer: COMMERCIAL

## 2019-09-09 DIAGNOSIS — D50.0 IRON DEFICIENCY ANEMIA DUE TO CHRONIC BLOOD LOSS: ICD-10-CM

## 2019-09-09 LAB
BASOPHILS # BLD AUTO: 0.02 X10(3) UL (ref 0–0.2)
BASOPHILS NFR BLD AUTO: 0.4 %
CHLORIDE SERPL-SCNC: 109 MMOL/L (ref 98–112)
CO2 SERPL-SCNC: 29 MMOL/L (ref 21–32)
DEPRECATED HBV CORE AB SER IA-ACNC: 72.9 NG/ML (ref 18–340)
DEPRECATED RDW RBC AUTO: 47.8 FL (ref 35.1–46.3)
EOSINOPHIL # BLD AUTO: 0.17 X10(3) UL (ref 0–0.7)
EOSINOPHIL NFR BLD AUTO: 3.5 %
ERYTHROCYTE [DISTWIDTH] IN BLOOD BY AUTOMATED COUNT: 13.5 % (ref 11–15)
HCT VFR BLD AUTO: 36 % (ref 35–48)
HGB BLD-MCNC: 11.4 G/DL (ref 12–16)
IMM GRANULOCYTES # BLD AUTO: 0.01 X10(3) UL (ref 0–1)
IMM GRANULOCYTES NFR BLD: 0.2 %
IRON SATURATION: 12 % (ref 15–50)
IRON SERPL-MCNC: 43 UG/DL (ref 50–170)
LYMPHOCYTES # BLD AUTO: 1.43 X10(3) UL (ref 1–4)
LYMPHOCYTES NFR BLD AUTO: 29.1 %
MCH RBC QN AUTO: 30.3 PG (ref 26–34)
MCHC RBC AUTO-ENTMCNC: 31.7 G/DL (ref 31–37)
MCV RBC AUTO: 95.7 FL (ref 80–100)
MONOCYTES # BLD AUTO: 0.47 X10(3) UL (ref 0.1–1)
MONOCYTES NFR BLD AUTO: 9.6 %
NEUTROPHILS # BLD AUTO: 2.81 X10 (3) UL (ref 1.5–7.7)
NEUTROPHILS # BLD AUTO: 2.81 X10(3) UL (ref 1.5–7.7)
NEUTROPHILS NFR BLD AUTO: 57.2 %
PLATELET # BLD AUTO: 214 10(3)UL (ref 150–450)
POTASSIUM SERPL-SCNC: 4.2 MMOL/L (ref 3.5–5.1)
RBC # BLD AUTO: 3.76 X10(6)UL (ref 3.8–5.3)
SODIUM SERPL-SCNC: 142 MMOL/L (ref 136–145)
TOTAL IRON BINDING CAPACITY: 367 UG/DL (ref 240–450)
TRANSFERRIN SERPL-MCNC: 246 MG/DL (ref 200–360)
WBC # BLD AUTO: 4.9 X10(3) UL (ref 4–11)

## 2019-09-09 PROCEDURE — 80051 ELECTROLYTE PANEL: CPT

## 2019-09-09 PROCEDURE — 82728 ASSAY OF FERRITIN: CPT

## 2019-09-09 PROCEDURE — 83540 ASSAY OF IRON: CPT

## 2019-09-09 PROCEDURE — 36415 COLL VENOUS BLD VENIPUNCTURE: CPT

## 2019-09-09 PROCEDURE — 85025 COMPLETE CBC W/AUTO DIFF WBC: CPT

## 2019-09-09 PROCEDURE — 83550 IRON BINDING TEST: CPT

## 2019-09-16 ENCOUNTER — ANESTHESIA EVENT (OUTPATIENT)
Dept: ENDOSCOPY | Facility: HOSPITAL | Age: 64
End: 2019-09-16
Payer: COMMERCIAL

## 2019-09-18 ENCOUNTER — HOSPITAL ENCOUNTER (OUTPATIENT)
Facility: HOSPITAL | Age: 64
Setting detail: HOSPITAL OUTPATIENT SURGERY
Discharge: HOME | End: 2019-09-18
Attending: INTERNAL MEDICINE | Admitting: INTERNAL MEDICINE
Payer: COMMERCIAL

## 2019-09-18 ENCOUNTER — ANESTHESIA (OUTPATIENT)
Dept: ENDOSCOPY | Facility: HOSPITAL | Age: 64
End: 2019-09-18
Payer: COMMERCIAL

## 2019-09-18 VITALS
HEIGHT: 61 IN | HEART RATE: 48 BPM | TEMPERATURE: 98 F | OXYGEN SATURATION: 98 % | WEIGHT: 105 LBS | DIASTOLIC BLOOD PRESSURE: 70 MMHG | RESPIRATION RATE: 18 BRPM | BODY MASS INDEX: 19.83 KG/M2 | SYSTOLIC BLOOD PRESSURE: 105 MMHG

## 2019-09-18 DIAGNOSIS — D50.0 IRON DEFICIENCY ANEMIA DUE TO CHRONIC BLOOD LOSS: Primary | ICD-10-CM

## 2019-09-18 PROCEDURE — 0DB68ZX EXCISION OF STOMACH, VIA NATURAL OR ARTIFICIAL OPENING ENDOSCOPIC, DIAGNOSTIC: ICD-10-PCS | Performed by: INTERNAL MEDICINE

## 2019-09-18 PROCEDURE — 0DB98ZX EXCISION OF DUODENUM, VIA NATURAL OR ARTIFICIAL OPENING ENDOSCOPIC, DIAGNOSTIC: ICD-10-PCS | Performed by: INTERNAL MEDICINE

## 2019-09-18 PROCEDURE — 88305 TISSUE EXAM BY PATHOLOGIST: CPT | Performed by: INTERNAL MEDICINE

## 2019-09-18 PROCEDURE — 0DJD8ZZ INSPECTION OF LOWER INTESTINAL TRACT, VIA NATURAL OR ARTIFICIAL OPENING ENDOSCOPIC: ICD-10-PCS | Performed by: INTERNAL MEDICINE

## 2019-09-18 RX ORDER — NALOXONE HYDROCHLORIDE 0.4 MG/ML
80 INJECTION, SOLUTION INTRAMUSCULAR; INTRAVENOUS; SUBCUTANEOUS AS NEEDED
Status: CANCELLED | OUTPATIENT
Start: 2019-09-18 | End: 2019-09-18

## 2019-09-18 RX ORDER — SODIUM CHLORIDE, SODIUM LACTATE, POTASSIUM CHLORIDE, CALCIUM CHLORIDE 600; 310; 30; 20 MG/100ML; MG/100ML; MG/100ML; MG/100ML
INJECTION, SOLUTION INTRAVENOUS CONTINUOUS
Status: CANCELLED | OUTPATIENT
Start: 2019-09-18

## 2019-09-18 RX ORDER — SODIUM CHLORIDE, SODIUM LACTATE, POTASSIUM CHLORIDE, CALCIUM CHLORIDE 600; 310; 30; 20 MG/100ML; MG/100ML; MG/100ML; MG/100ML
INJECTION, SOLUTION INTRAVENOUS CONTINUOUS
Status: DISCONTINUED | OUTPATIENT
Start: 2019-09-18 | End: 2019-09-18

## 2019-09-18 NOTE — OPERATIVE REPORT
[facility] Patient Status:  Hospital Outpatient Surgery    1955 MRN [id number]   [facility] ENDOSCOPY Attending Joaquín Banda DO   Hosp Day # 0 PCP Patricia Puri MD       PREOPERATIVE DIAGNOSIS/INDICATION: Iron [place] normal.  Transverse colon: The mucosa and vascular pattern were normal.  Descending colon: The mucosa and vascular pattern were normal.  Sigmoid colon: Few diverticula. Rectum:  The mucosa and vascular pattern were normal. Retroflexion revealed  internal

## 2019-09-18 NOTE — ANESTHESIA POSTPROCEDURE EVALUATION
[address] [facility],2Nd Floor Patient Status:  Hospital Outpatient Surgery   Age/Gender 61year old female MRN [id number]   Location [facility]. Attending Chad Angelo, [facility] Day # 0 PCP Wally Ding MD       Anesthesia Post-

## 2019-09-18 NOTE — OR NURSING
Patient just waking up and is putting contacts back in.  instructed her to wait. Encouraged her to wait. She stated, \"I'll be fine\".

## 2019-09-18 NOTE — ANESTHESIA PREPROCEDURE EVALUATION
PRE-OP EVALUATION    Patient Name: Petar Ragland Malissa    Pre-op Diagnosis: PANCREATIC CYST    Procedure(s):  ENDOSCOPIC ULTRASOUND     Surgeon(s) and Role:     * Basil Raya, DO - Keshav    Pre-op vitals reviewed.   Temp: 98.1 °F (36.7 °C)  Pulse: 09/09/2019    RBC 3.76 (L) 09/09/2019    HGB 11.4 (L) 09/09/2019    HCT 36.0 09/09/2019    MCV 95.7 09/09/2019    MCH 30.3 09/09/2019    MCHC 31.7 09/09/2019    RDW 13.5 09/09/2019    .0 09/09/2019     Lab Results   Component Value Date     09

## 2019-09-18 NOTE — H&P
History & Physical Examination    Patient Name: Bryn Wilkerson  MRN: [id number]  [facility]: [id number]  YOB: 1955    Diagnosis: iron def anemia    Present Illness: 60 y/o F history as above presents for egd/colonoscopy.        Medications Crissy Blood disorder     anemia   • [facility]    right   • Esophageal reflux    • Exposure to medical diagnostic radiation 1999   • Flatulence/gas pain/belching    • Leg swelling    • Osteoporosis    • Personal history of antineoplastic chemotherapy 1

## 2019-09-25 ENCOUNTER — NURSE ONLY (OUTPATIENT)
Dept: HEMATOLOGY/ONCOLOGY | Age: 64
End: 2019-09-25
Attending: INTERNAL MEDICINE
Payer: COMMERCIAL

## 2019-09-25 DIAGNOSIS — D50.0 IRON DEFICIENCY ANEMIA DUE TO CHRONIC BLOOD LOSS: Primary | ICD-10-CM

## 2019-09-25 LAB
BASOPHILS # BLD AUTO: 0.02 X10(3) UL (ref 0–0.2)
BASOPHILS NFR BLD AUTO: 0.4 %
DEPRECATED HBV CORE AB SER IA-ACNC: 51.9 NG/ML (ref 18–340)
DEPRECATED RDW RBC AUTO: 46.1 FL (ref 35.1–46.3)
EOSINOPHIL # BLD AUTO: 0.18 X10(3) UL (ref 0–0.7)
EOSINOPHIL NFR BLD AUTO: 3.6 %
ERYTHROCYTE [DISTWIDTH] IN BLOOD BY AUTOMATED COUNT: 13.5 % (ref 11–15)
HCT VFR BLD AUTO: 33.9 % (ref 35–48)
HGB BLD-MCNC: 11.1 G/DL (ref 12–16)
IMM GRANULOCYTES # BLD AUTO: 0.03 X10(3) UL (ref 0–1)
IMM GRANULOCYTES NFR BLD: 0.6 %
IRON SATURATION: 17 % (ref 15–50)
IRON SERPL-MCNC: 54 UG/DL (ref 50–170)
LYMPHOCYTES # BLD AUTO: 1.56 X10(3) UL (ref 1–4)
LYMPHOCYTES NFR BLD AUTO: 30.8 %
MCH RBC QN AUTO: 30.2 PG (ref 26–34)
MCHC RBC AUTO-ENTMCNC: 32.7 G/DL (ref 31–37)
MCV RBC AUTO: 92.1 FL (ref 80–100)
MONOCYTES # BLD AUTO: 0.42 X10(3) UL (ref 0.1–1)
MONOCYTES NFR BLD AUTO: 8.3 %
NEUTROPHILS # BLD AUTO: 2.85 X10 (3) UL (ref 1.5–7.7)
NEUTROPHILS # BLD AUTO: 2.85 X10(3) UL (ref 1.5–7.7)
NEUTROPHILS NFR BLD AUTO: 56.3 %
PLATELET # BLD AUTO: 215 10(3)UL (ref 150–450)
RBC # BLD AUTO: 3.68 X10(6)UL (ref 3.8–5.3)
TOTAL IRON BINDING CAPACITY: 320 UG/DL (ref 240–450)
TRANSFERRIN SERPL-MCNC: 215 MG/DL (ref 200–360)
WBC # BLD AUTO: 5.1 X10(3) UL (ref 4–11)

## 2019-09-25 PROCEDURE — 36415 COLL VENOUS BLD VENIPUNCTURE: CPT

## 2019-09-25 PROCEDURE — 83550 IRON BINDING TEST: CPT

## 2019-09-25 PROCEDURE — 82728 ASSAY OF FERRITIN: CPT

## 2019-09-25 PROCEDURE — 83540 ASSAY OF IRON: CPT

## 2019-09-25 PROCEDURE — 85025 COMPLETE CBC W/AUTO DIFF WBC: CPT

## 2019-09-26 ENCOUNTER — APPOINTMENT (OUTPATIENT)
Dept: HEMATOLOGY/ONCOLOGY | Age: 64
End: 2019-09-26
Attending: INTERNAL MEDICINE
Payer: COMMERCIAL

## 2019-09-27 ENCOUNTER — TELEPHONE (OUTPATIENT)
Dept: HEMATOLOGY/ONCOLOGY | Facility: HOSPITAL | Age: 64
End: 2019-09-27

## 2019-09-27 DIAGNOSIS — D50.0 IRON DEFICIENCY ANEMIA DUE TO CHRONIC BLOOD LOSS: Primary | ICD-10-CM

## 2019-09-27 NOTE — TELEPHONE ENCOUNTER
Test(s) completed: CBC, iron studies  Results: iron studies are normal, HGB 11.2  Plan: repeat labs in 2 months.

## 2019-11-25 ENCOUNTER — APPOINTMENT (OUTPATIENT)
Dept: LAB | Age: 64
End: 2019-11-25
Payer: COMMERCIAL

## 2019-11-25 DIAGNOSIS — K25.3 ACUTE GASTRIC ULCER: ICD-10-CM

## 2019-11-25 PROCEDURE — 36415 COLL VENOUS BLD VENIPUNCTURE: CPT

## 2019-11-25 PROCEDURE — 80051 ELECTROLYTE PANEL: CPT

## 2019-11-26 ENCOUNTER — ANESTHESIA EVENT (OUTPATIENT)
Dept: ENDOSCOPY | Facility: HOSPITAL | Age: 64
End: 2019-11-26
Payer: COMMERCIAL

## 2019-12-04 ENCOUNTER — HOSPITAL ENCOUNTER (OUTPATIENT)
Facility: HOSPITAL | Age: 64
Setting detail: HOSPITAL OUTPATIENT SURGERY
Discharge: HOME | End: 2019-12-04
Attending: INTERNAL MEDICINE | Admitting: INTERNAL MEDICINE
Payer: COMMERCIAL

## 2019-12-04 ENCOUNTER — ANESTHESIA (OUTPATIENT)
Dept: ENDOSCOPY | Facility: HOSPITAL | Age: 64
End: 2019-12-04
Payer: COMMERCIAL

## 2019-12-04 VITALS
WEIGHT: 102 LBS | TEMPERATURE: 98 F | HEIGHT: 61 IN | OXYGEN SATURATION: 100 % | DIASTOLIC BLOOD PRESSURE: 62 MMHG | SYSTOLIC BLOOD PRESSURE: 162 MMHG | HEART RATE: 50 BPM | RESPIRATION RATE: 18 BRPM | BODY MASS INDEX: 19.26 KG/M2

## 2019-12-04 DIAGNOSIS — K25.3 ACUTE GASTRIC ULCER, UNSPECIFIED WHETHER GASTRIC ULCER HEMORRHAGE OR PERFORATION PRESENT: ICD-10-CM

## 2019-12-04 DIAGNOSIS — K25.3 ACUTE GASTRIC ULCER: Primary | ICD-10-CM

## 2019-12-04 PROCEDURE — 0DJ08ZZ INSPECTION OF UPPER INTESTINAL TRACT, VIA NATURAL OR ARTIFICIAL OPENING ENDOSCOPIC: ICD-10-PCS | Performed by: INTERNAL MEDICINE

## 2019-12-04 RX ORDER — NALOXONE HYDROCHLORIDE 0.4 MG/ML
80 INJECTION, SOLUTION INTRAMUSCULAR; INTRAVENOUS; SUBCUTANEOUS AS NEEDED
Status: DISCONTINUED | OUTPATIENT
Start: 2019-12-04 | End: 2019-12-04

## 2019-12-04 RX ORDER — SODIUM CHLORIDE, SODIUM LACTATE, POTASSIUM CHLORIDE, CALCIUM CHLORIDE 600; 310; 30; 20 MG/100ML; MG/100ML; MG/100ML; MG/100ML
INJECTION, SOLUTION INTRAVENOUS CONTINUOUS
Status: DISCONTINUED | OUTPATIENT
Start: 2019-12-04 | End: 2019-12-04

## 2019-12-04 RX ORDER — HYDROMORPHONE HYDROCHLORIDE 1 MG/ML
0.4 INJECTION, SOLUTION INTRAMUSCULAR; INTRAVENOUS; SUBCUTANEOUS EVERY 5 MIN PRN
Status: DISCONTINUED | OUTPATIENT
Start: 2019-12-04 | End: 2019-12-04

## 2019-12-04 NOTE — ANESTHESIA PREPROCEDURE EVALUATION
PRE-OP EVALUATION    Patient Name: Demond Leonardo    Pre-op Diagnosis: Acute gastric ulcer, unspecified whether gastric ulcer hemorrhage or perforation present [K25.3]    Procedure(s):  ESOPHAGOGASTRODUODENOSCOPY    Surgeon(s) and Role:     * Charles Hodge History of breast cancer     Rectal prolapse     Nonischemic cardiomyopathy (HCC)     Iron deficiency anemia due to chronic blood loss     Osteoporosis of multiple sites          Past Surgical History:   Procedure Laterality Date   • BREAST BIOPSY     • [facility] explained including but not limited to aspiration, mouth/dental/airway injury, PONV explained. Understanding expressed as well as a wish to proceed.     Plan/risks discussed with: patient and spouse                Present on Admission:  **None**

## 2019-12-04 NOTE — ANESTHESIA POSTPROCEDURE EVALUATION
[address] [facility],2Nd Floor Patient Status:  Hospital Outpatient Surgery   Age/Gender 59year old female MRN [id number]   Location [facility]. Attending Kimberly Zhou, [facility] Day # 0 PCP Malcolm Mendoza MD       Anesthesia Post-

## 2019-12-04 NOTE — OPERATIVE REPORT
[facility] Patient Status:  Hospital Outpatient Surgery    1955 MRN [id number]   [facility] ENDOSCOPY Attending Martine Juan DO   Hosp Day # 0 PCP Brenda Funes MD       PREOPERATIVE DIAGNOSIS/INDICATION: [place] Aide consider capsule endoscopy.      Melony Galicia  Gastroenterology/Advanced Endoscopy  [facility]

## 2019-12-04 NOTE — H&P
History & Physical Examination    Patient Name: Sophie Bertrand  MRN: [id number]  CSN: [id number]  YOB: 1955    Diagnosis: history of gastric ulcer    Present Illness: 58 y/o F history as above presents for egd.      Pantoprazole Sodium impairment     contacts, glasses     Past Surgical History:   Procedure Laterality Date   • BREAST BIOPSY     • CHOLECYSTECTOMY     • COLONOSCOPY     • COLONOSCOPY N/A 9/18/2019    Performed by Abundio Perez DO at [facility] ENDOSCOPY   • EGD     • ENDOSCOPIC

## 2020-02-17 ENCOUNTER — HOSPITAL ENCOUNTER (OUTPATIENT)
Dept: MRI IMAGING | Age: 65
Discharge: HOME | End: 2020-02-17
Attending: INTERNAL MEDICINE
Payer: COMMERCIAL

## 2020-02-17 DIAGNOSIS — K86.2 CYST OF PANCREAS: ICD-10-CM

## 2020-06-07 ENCOUNTER — HOSPITAL ENCOUNTER (EMERGENCY)
Age: 65
Discharge: HOME | End: 2020-06-07
Attending: EMERGENCY MEDICINE
Payer: COMMERCIAL

## 2020-06-07 VITALS
HEART RATE: 50 BPM | RESPIRATION RATE: 16 BRPM | SYSTOLIC BLOOD PRESSURE: 138 MMHG | DIASTOLIC BLOOD PRESSURE: 67 MMHG | OXYGEN SATURATION: 99 % | HEIGHT: 61 IN | BODY MASS INDEX: 19.26 KG/M2 | TEMPERATURE: 99 F | WEIGHT: 102 LBS

## 2020-06-07 DIAGNOSIS — R35.0 INCREASED URINARY FREQUENCY: Primary | ICD-10-CM

## 2020-06-07 DIAGNOSIS — N81.4 PROLAPSED UTERUS: ICD-10-CM

## 2020-06-07 PROCEDURE — 99283 EMERGENCY DEPT VISIT LOW MDM: CPT

## 2020-06-07 PROCEDURE — 80053 COMPREHEN METABOLIC PANEL: CPT | Performed by: EMERGENCY MEDICINE

## 2020-06-07 PROCEDURE — 81003 URINALYSIS AUTO W/O SCOPE: CPT | Performed by: EMERGENCY MEDICINE

## 2020-06-07 PROCEDURE — 36415 COLL VENOUS BLD VENIPUNCTURE: CPT

## 2020-06-07 PROCEDURE — 85025 COMPLETE CBC W/AUTO DIFF WBC: CPT | Performed by: EMERGENCY MEDICINE

## 2020-06-07 RX ORDER — TRAMADOL HYDROCHLORIDE 50 MG/1
50 TABLET ORAL EVERY 6 HOURS PRN
COMMUNITY

## 2020-06-07 NOTE — ED INITIAL ASSESSMENT (HPI)
Pt c/o suprapubic discomfort, able to urinate, urinary pressure, denies burning, pain or blood in urine, denies abdom pain/nausea/vomiting

## 2020-07-31 ENCOUNTER — TELEPHONE (OUTPATIENT)
Dept: HEMATOLOGY/ONCOLOGY | Facility: HOSPITAL | Age: 65
End: 2020-07-31

## 2020-07-31 NOTE — TELEPHONE ENCOUNTER
Test(s) completed: Mammogram done at [facility]   Results: Benign   Plan: results reviewed with patient, patient has a follow up w/Dr Jordan in a few weeks.

## 2020-07-31 NOTE — TELEPHONE ENCOUNTER
Smitha Price calling asking to speak to RN regarding results from [facility] .  T/y Audra Mustafa

## 2020-08-20 ENCOUNTER — OFFICE VISIT (OUTPATIENT)
Dept: HEMATOLOGY/ONCOLOGY | Age: 65
End: 2020-08-20
Attending: INTERNAL MEDICINE
Payer: COMMERCIAL

## 2020-08-20 VITALS
SYSTOLIC BLOOD PRESSURE: 135 MMHG | WEIGHT: 102.19 LBS | OXYGEN SATURATION: 99 % | BODY MASS INDEX: 19 KG/M2 | RESPIRATION RATE: 18 BRPM | TEMPERATURE: 98 F | DIASTOLIC BLOOD PRESSURE: 77 MMHG | HEART RATE: 59 BPM

## 2020-08-20 DIAGNOSIS — D50.0 IRON DEFICIENCY ANEMIA DUE TO CHRONIC BLOOD LOSS: ICD-10-CM

## 2020-08-20 DIAGNOSIS — Z12.39 ENCOUNTER FOR BREAST CANCER SCREENING OTHER THAN MAMMOGRAM: ICD-10-CM

## 2020-08-20 DIAGNOSIS — Z78.0 ASYMPTOMATIC MENOPAUSAL STATE: Primary | ICD-10-CM

## 2020-08-20 PROCEDURE — 99213 OFFICE O/P EST LOW 20 MIN: CPT | Performed by: INTERNAL MEDICINE

## 2020-08-20 NOTE — PROGRESS NOTES
Patient is here for MD f/u for breast cancer and anemia. Patient had surgery to repair a prolapse rectum in the winter of 2019. Patient no longer having issues with rectal bleeding. She had labs with PCP on Monday. Patient had a mammogram in July at [facility][note 2].

## 2020-08-25 NOTE — PROGRESS NOTES
[facility]    PATIENT'S NAME: Sherine Pappas   ATTENDING PHYSICIAN: Chago Florentino M.D.    PATIENT ACCOUNT #: [de-identified] LOCATION: [address] [place] [address] RECORD #: [id number] YOB: 1955   DATE OF SERVICE: 08/20/2020       KAROLINE daily, pantoprazole 40 mg b.i.d., potassium chloride 20 mEq 3 times a day, sacubitril/valsartan 24/26 daily, tramadol 50 mg q.6 h. p.r.n., and turmeric 500 mg daily. PHYSICAL EXAMINATION:    GENERAL:  She is a thin female in no acute distress.   VITAL SI

## 2021-03-03 DIAGNOSIS — Z23 NEED FOR VACCINATION: ICD-10-CM

## 2021-08-04 ENCOUNTER — TELEPHONE (OUTPATIENT)
Dept: HEMATOLOGY/ONCOLOGY | Age: 66
End: 2021-08-04

## 2021-08-04 NOTE — TELEPHONE ENCOUNTER
Spoke with pt she had mammogram done last week at outside facility. She had results sent to Dr. Otilia Cuevas and PCP. Informed pt she is due to see him this month. Sent to [facility] to make fup appt.

## 2021-08-04 NOTE — TELEPHONE ENCOUNTER
Patient has a questions regarding the timing of her mammograms, in the past she only went every 12 months, she received a letter stating due to \"dense\" results she should go every 6 months, she wants Dr Harrison Trent to confirm this new schedule.  Patient stated

## 2021-08-19 ENCOUNTER — APPOINTMENT (OUTPATIENT)
Dept: HEMATOLOGY/ONCOLOGY | Age: 66
End: 2021-08-19
Attending: INTERNAL MEDICINE
Payer: MEDICARE

## 2021-08-26 ENCOUNTER — OFFICE VISIT (OUTPATIENT)
Dept: HEMATOLOGY/ONCOLOGY | Age: 66
End: 2021-08-26
Attending: INTERNAL MEDICINE
Payer: MEDICARE

## 2021-08-26 VITALS
OXYGEN SATURATION: 99 % | SYSTOLIC BLOOD PRESSURE: 116 MMHG | BODY MASS INDEX: 19 KG/M2 | WEIGHT: 100.31 LBS | HEART RATE: 57 BPM | RESPIRATION RATE: 16 BRPM | TEMPERATURE: 99 F | DIASTOLIC BLOOD PRESSURE: 65 MMHG

## 2021-08-26 DIAGNOSIS — Z78.0 ASYMPTOMATIC MENOPAUSAL STATE: ICD-10-CM

## 2021-08-26 DIAGNOSIS — Z85.3 HISTORY OF BREAST CANCER: ICD-10-CM

## 2021-08-26 DIAGNOSIS — K86.2 PANCREATIC CYST: Primary | ICD-10-CM

## 2021-08-26 PROCEDURE — 99213 OFFICE O/P EST LOW 20 MIN: CPT | Performed by: INTERNAL MEDICINE

## 2021-08-26 NOTE — PROGRESS NOTES
Patient is here for MD f/u for Breast cancer and Anemia. Last mammogram was in July at [facility]. Patient was advised to get every 6 month mammograms due to dense breasts. Patient would like Dr Pyle Nice opinion on this. Last DEXA scan was in 2019.  Patient has n

## 2021-08-31 NOTE — PROGRESS NOTES
[facility]    PATIENT'S NAME: Carrie Simon   ATTENDING PHYSICIAN: Johnnie Hancock M.D.    PATIENT ACCOUNT #: [de-identified] LOCATION: [address] [place] [address] RECORD #: [id number] YOB: 1955   DATE OF SERVICE: 08/26/2021       KAROLINE fluticasone propionate nasal spray p.r.n.; guaifenesin extended release 1200 mg b.i.d.; metoprolol succinate extended release 50 mg daily; pantoprazole 40 mg twice daily; potassium chloride 20 mEq 3 times a day; Entresto 24-26 at 1 tablet b.i.d.; tramadol

## 2021-09-17 ENCOUNTER — TELEPHONE (OUTPATIENT)
Dept: HEMATOLOGY/ONCOLOGY | Facility: HOSPITAL | Age: 66
End: 2021-09-17

## 2021-09-17 NOTE — TELEPHONE ENCOUNTER
Test(s) completed: DEXA scan done at [facility]  Results: Per Dr Apple Many scan is slightly better\"  Plan: continue weight bearing exercises, Calcium and Vitamin D daily. Patient verbalized understanding.

## 2021-10-19 PROBLEM — M54.50 LOW BACK PAIN: Status: ACTIVE | Noted: 2020-11-21

## 2021-10-19 PROBLEM — E78.00 HYPERCHOLESTEROLEMIA: Status: ACTIVE | Noted: 2020-08-21

## 2021-10-19 PROBLEM — H66.92 INFECTION OF LEFT EAR: Status: ACTIVE | Noted: 2021-09-02

## 2021-10-19 PROBLEM — K64.9 HEMORRHOIDS: Status: ACTIVE | Noted: 2021-06-09

## 2021-10-19 PROBLEM — H00.12 CHALAZION OF RIGHT LOWER EYELID: Status: ACTIVE | Noted: 2021-02-13

## 2022-01-24 ENCOUNTER — TELEPHONE (OUTPATIENT)
Dept: HEMATOLOGY/ONCOLOGY | Facility: HOSPITAL | Age: 67
End: 2022-01-24

## 2022-01-24 NOTE — TELEPHONE ENCOUNTER
Ember Peralta calling says her dr olvera to send over lab results showing hemoglobin low. And asking if she needs to f/u with dr Thomas Porter.  shraddha

## 2022-06-30 ENCOUNTER — TELEPHONE (OUTPATIENT)
Dept: HEMATOLOGY/ONCOLOGY | Facility: HOSPITAL | Age: 67
End: 2022-06-30

## 2022-06-30 NOTE — TELEPHONE ENCOUNTER
Spoke with patient. We have not received any results. [address] [facility] notified. Request faxed to [phone]. Will await report. Patient verbalized understanding of plan.

## 2022-06-30 NOTE — TELEPHONE ENCOUNTER
Patient called regarding imaging she had sent to Dr. Collins with a liver mass. Please call when able. Thank you.

## 2022-07-01 ENCOUNTER — TELEPHONE (OUTPATIENT)
Dept: HEMATOLOGY/ONCOLOGY | Facility: HOSPITAL | Age: 67
End: 2022-07-01

## 2022-07-01 DIAGNOSIS — R16.0 LIVER MASS: Primary | ICD-10-CM

## 2022-07-01 DIAGNOSIS — K86.2 PANCREATIC CYST: ICD-10-CM

## 2022-07-01 NOTE — TELEPHONE ENCOUNTER
Please call patient. She was told to see a liver doctor but thougth she'd get Dr. Sintia Hankins opinion.

## 2022-07-01 NOTE — PROGRESS NOTES
Spoke to patient something in the liver was seen on MRI of the heart. US showed mass in liver that looks like a hemangioma. Last MRI in our system from 2019 showed a stable hemangioma,.  Given this and her prior pancreatic cystic lesion will order MRI and  MRCP  A Julian

## 2022-07-26 ENCOUNTER — HOSPITAL ENCOUNTER (OUTPATIENT)
Dept: MRI IMAGING | Facility: HOSPITAL | Age: 67
Discharge: HOME | End: 2022-07-26
Attending: INTERNAL MEDICINE
Payer: MEDICARE

## 2022-07-26 DIAGNOSIS — K86.2 PANCREATIC CYST: ICD-10-CM

## 2022-07-26 DIAGNOSIS — R16.0 LIVER MASS: ICD-10-CM

## 2022-07-26 PROCEDURE — 74183 MRI ABD W/O CNTR FLWD CNTR: CPT | Performed by: INTERNAL MEDICINE

## 2022-07-26 PROCEDURE — 76376 3D RENDER W/INTRP POSTPROCES: CPT | Performed by: INTERNAL MEDICINE

## 2022-07-26 PROCEDURE — A9575 INJ GADOTERATE MEGLUMI 0.1ML: HCPCS | Performed by: INTERNAL MEDICINE

## 2022-07-27 ENCOUNTER — TELEPHONE (OUTPATIENT)
Dept: HEMATOLOGY/ONCOLOGY | Facility: HOSPITAL | Age: 67
End: 2022-07-27

## 2022-07-27 NOTE — TELEPHONE ENCOUNTER
Spoke to patient. Case discussed at [address] [place] [address]. Size still small but slowly growing. Should be sampled by EUS. Spoke to Dr Mike Ruiz - they will each out to her.   BOLIVAR Jordan

## 2022-08-04 ENCOUNTER — APPOINTMENT (OUTPATIENT)
Dept: HEMATOLOGY/ONCOLOGY | Age: 67
End: 2022-08-04
Attending: INTERNAL MEDICINE
Payer: MEDICARE

## 2022-08-17 ENCOUNTER — LABORATORY ENCOUNTER (OUTPATIENT)
Dept: LAB | Age: 67
End: 2022-08-17
Payer: MEDICARE

## 2022-08-17 ENCOUNTER — LAB ENCOUNTER (OUTPATIENT)
Dept: LAB | Age: 67
End: 2022-08-17
Attending: INTERNAL MEDICINE
Payer: MEDICARE

## 2022-08-17 DIAGNOSIS — Z01.818 PRE-OP TESTING: ICD-10-CM

## 2022-08-17 DIAGNOSIS — Z20.822 ENCOUNTER FOR PREPROCEDURE SCREENING LABORATORY TESTING FOR COVID-19: ICD-10-CM

## 2022-08-17 DIAGNOSIS — Z01.812 ENCOUNTER FOR PREPROCEDURE SCREENING LABORATORY TESTING FOR COVID-19: ICD-10-CM

## 2022-08-17 LAB
CHLORIDE SERPL-SCNC: 105 MMOL/L (ref 98–112)
CO2 SERPL-SCNC: 28 MMOL/L (ref 21–32)
POTASSIUM SERPL-SCNC: 4 MMOL/L (ref 3.5–5.1)
SODIUM SERPL-SCNC: 135 MMOL/L (ref 136–145)

## 2022-08-17 PROCEDURE — 80051 ELECTROLYTE PANEL: CPT

## 2022-08-17 PROCEDURE — 36415 COLL VENOUS BLD VENIPUNCTURE: CPT

## 2022-08-17 RX ORDER — DOXYCYCLINE HYCLATE 50 MG/1
50 CAPSULE ORAL 2 TIMES DAILY
COMMUNITY

## 2022-08-18 ENCOUNTER — ANESTHESIA EVENT (OUTPATIENT)
Dept: ENDOSCOPY | Facility: HOSPITAL | Age: 67
End: 2022-08-18
Payer: MEDICARE

## 2022-08-18 LAB — SARS-COV-2 RNA RESP QL NAA+PROBE: NOT DETECTED

## 2022-08-19 ENCOUNTER — HOSPITAL ENCOUNTER (OUTPATIENT)
Facility: HOSPITAL | Age: 67
Setting detail: HOSPITAL OUTPATIENT SURGERY
Discharge: HOME | End: 2022-08-19
Attending: INTERNAL MEDICINE | Admitting: INTERNAL MEDICINE
Payer: MEDICARE

## 2022-08-19 ENCOUNTER — APPOINTMENT (OUTPATIENT)
Dept: GENERAL RADIOLOGY | Age: 67
End: 2022-08-19
Attending: PHYSICIAN ASSISTANT
Payer: MEDICARE

## 2022-08-19 ENCOUNTER — HOSPITAL ENCOUNTER (EMERGENCY)
Age: 67
Discharge: HOME | End: 2022-08-19
Attending: EMERGENCY MEDICINE
Payer: MEDICARE

## 2022-08-19 ENCOUNTER — HOSPITAL ENCOUNTER (EMERGENCY)
Facility: HOSPITAL | Age: 67
Discharge: LEFT BEFORE DISCHARGE | End: 2022-08-19
Payer: MEDICARE

## 2022-08-19 ENCOUNTER — APPOINTMENT (OUTPATIENT)
Dept: CT IMAGING | Age: 67
End: 2022-08-19
Attending: EMERGENCY MEDICINE
Payer: MEDICARE

## 2022-08-19 ENCOUNTER — ANESTHESIA (OUTPATIENT)
Dept: ENDOSCOPY | Facility: HOSPITAL | Age: 67
End: 2022-08-19
Payer: MEDICARE

## 2022-08-19 VITALS
HEIGHT: 61 IN | WEIGHT: 102 LBS | TEMPERATURE: 98 F | SYSTOLIC BLOOD PRESSURE: 145 MMHG | HEART RATE: 50 BPM | DIASTOLIC BLOOD PRESSURE: 74 MMHG | OXYGEN SATURATION: 100 % | BODY MASS INDEX: 19.26 KG/M2 | RESPIRATION RATE: 16 BRPM

## 2022-08-19 VITALS
HEART RATE: 47 BPM | WEIGHT: 102 LBS | SYSTOLIC BLOOD PRESSURE: 147 MMHG | BODY MASS INDEX: 19 KG/M2 | RESPIRATION RATE: 16 BRPM | DIASTOLIC BLOOD PRESSURE: 53 MMHG | OXYGEN SATURATION: 99 %

## 2022-08-19 DIAGNOSIS — Z01.818 PRE-OP TESTING: ICD-10-CM

## 2022-08-19 DIAGNOSIS — R07.89 CHEST PAIN, ATYPICAL: Primary | ICD-10-CM

## 2022-08-19 DIAGNOSIS — K86.2 PANCREAS CYST: ICD-10-CM

## 2022-08-19 DIAGNOSIS — Z01.812 ENCOUNTER FOR PREPROCEDURE SCREENING LABORATORY TESTING FOR COVID-19: Primary | ICD-10-CM

## 2022-08-19 DIAGNOSIS — Z20.822 ENCOUNTER FOR PREPROCEDURE SCREENING LABORATORY TESTING FOR COVID-19: Primary | ICD-10-CM

## 2022-08-19 LAB
ALBUMIN SERPL-MCNC: 3.4 G/DL (ref 3.4–5)
ALBUMIN/GLOB SERPL: 1.1 {RATIO} (ref 1–2)
ALP LIVER SERPL-CCNC: 63 U/L
ALT SERPL-CCNC: 28 U/L
ANION GAP SERPL CALC-SCNC: 4 MMOL/L (ref 0–18)
AST SERPL-CCNC: 17 U/L (ref 15–37)
ATRIAL RATE: 41 BPM
BASOPHILS # BLD AUTO: 0.02 X10(3) UL (ref 0–0.2)
BASOPHILS NFR BLD AUTO: 0.3 %
BILIRUB SERPL-MCNC: 0.5 MG/DL (ref 0.1–2)
BUN BLD-MCNC: 15 MG/DL (ref 7–18)
CALCIUM BLD-MCNC: 9.4 MG/DL (ref 8.5–10.1)
CHLORIDE SERPL-SCNC: 101 MMOL/L (ref 98–112)
CO2 SERPL-SCNC: 32 MMOL/L (ref 21–32)
CREAT BLD-MCNC: 0.66 MG/DL
EOSINOPHIL # BLD AUTO: 0.06 X10(3) UL (ref 0–0.7)
EOSINOPHIL NFR BLD AUTO: 0.8 %
ERYTHROCYTE [DISTWIDTH] IN BLOOD BY AUTOMATED COUNT: 14.3 %
GFR SERPLBLD BASED ON 1.73 SQ M-ARVRAT: 97 ML/MIN/1.73M2 (ref 60–?)
GLOBULIN PLAS-MCNC: 3.1 G/DL (ref 2.8–4.4)
GLUCOSE BLD-MCNC: 131 MG/DL (ref 70–99)
HCT VFR BLD AUTO: 39.3 %
HGB BLD-MCNC: 13 G/DL
IMM GRANULOCYTES # BLD AUTO: 0.03 X10(3) UL (ref 0–1)
IMM GRANULOCYTES NFR BLD: 0.4 %
LIPASE SERPL-CCNC: 113 U/L (ref 73–393)
LYMPHOCYTES # BLD AUTO: 1.41 X10(3) UL (ref 1–4)
LYMPHOCYTES NFR BLD AUTO: 19 %
MCH RBC QN AUTO: 29 PG (ref 26–34)
MCHC RBC AUTO-ENTMCNC: 33.1 G/DL (ref 31–37)
MCV RBC AUTO: 87.5 FL
MONOCYTES # BLD AUTO: 0.59 X10(3) UL (ref 0.1–1)
MONOCYTES NFR BLD AUTO: 8 %
NEUTROPHILS # BLD AUTO: 5.3 X10 (3) UL (ref 1.5–7.7)
NEUTROPHILS # BLD AUTO: 5.3 X10(3) UL (ref 1.5–7.7)
NEUTROPHILS NFR BLD AUTO: 71.5 %
OSMOLALITY SERPL CALC.SUM OF ELEC: 287 MOSM/KG (ref 275–295)
P AXIS: 69 DEGREES
P-R INTERVAL: 172 MS
PLATELET # BLD AUTO: 181 10(3)UL (ref 150–450)
POTASSIUM SERPL-SCNC: 3.2 MMOL/L (ref 3.5–5.1)
PROT SERPL-MCNC: 6.5 G/DL (ref 6.4–8.2)
Q-T INTERVAL: 508 MS
QRS DURATION: 150 MS
QTC CALCULATION (BEZET): 419 MS
R AXIS: -75 DEGREES
RBC # BLD AUTO: 4.49 X10(6)UL
SARS-COV-2 RNA RESP QL NAA+PROBE: NOT DETECTED
SODIUM SERPL-SCNC: 137 MMOL/L (ref 136–145)
T AXIS: 27 DEGREES
TROPONIN I HIGH SENSITIVITY: 10 NG/L
TROPONIN I HIGH SENSITIVITY: 8 NG/L
VENTRICULAR RATE: 41 BPM
WBC # BLD AUTO: 7.4 X10(3) UL (ref 4–11)

## 2022-08-19 PROCEDURE — 71045 X-RAY EXAM CHEST 1 VIEW: CPT | Performed by: PHYSICIAN ASSISTANT

## 2022-08-19 PROCEDURE — 96366 THER/PROPH/DIAG IV INF ADDON: CPT

## 2022-08-19 PROCEDURE — 82378 CARCINOEMBRYONIC ANTIGEN: CPT | Performed by: INTERNAL MEDICINE

## 2022-08-19 PROCEDURE — 82150 ASSAY OF AMYLASE: CPT | Performed by: INTERNAL MEDICINE

## 2022-08-19 PROCEDURE — 83690 ASSAY OF LIPASE: CPT | Performed by: EMERGENCY MEDICINE

## 2022-08-19 PROCEDURE — 99285 EMERGENCY DEPT VISIT HI MDM: CPT

## 2022-08-19 PROCEDURE — BD47ZZZ ULTRASONOGRAPHY OF GASTROINTESTINAL TRACT: ICD-10-PCS | Performed by: INTERNAL MEDICINE

## 2022-08-19 PROCEDURE — 96361 HYDRATE IV INFUSION ADD-ON: CPT

## 2022-08-19 PROCEDURE — 85025 COMPLETE CBC W/AUTO DIFF WBC: CPT | Performed by: PHYSICIAN ASSISTANT

## 2022-08-19 PROCEDURE — 0F9G8ZX DRAINAGE OF PANCREAS, VIA NATURAL OR ARTIFICIAL OPENING ENDOSCOPIC, DIAGNOSTIC: ICD-10-PCS | Performed by: INTERNAL MEDICINE

## 2022-08-19 PROCEDURE — 84484 ASSAY OF TROPONIN QUANT: CPT | Performed by: PHYSICIAN ASSISTANT

## 2022-08-19 PROCEDURE — 96375 TX/PRO/DX INJ NEW DRUG ADDON: CPT

## 2022-08-19 PROCEDURE — 80053 COMPREHEN METABOLIC PANEL: CPT | Performed by: PHYSICIAN ASSISTANT

## 2022-08-19 PROCEDURE — 71275 CT ANGIOGRAPHY CHEST: CPT | Performed by: EMERGENCY MEDICINE

## 2022-08-19 PROCEDURE — 93010 ELECTROCARDIOGRAM REPORT: CPT

## 2022-08-19 PROCEDURE — 93005 ELECTROCARDIOGRAM TRACING: CPT

## 2022-08-19 PROCEDURE — 84484 ASSAY OF TROPONIN QUANT: CPT | Performed by: EMERGENCY MEDICINE

## 2022-08-19 PROCEDURE — 96365 THER/PROPH/DIAG IV INF INIT: CPT

## 2022-08-19 RX ORDER — POTASSIUM CHLORIDE 14.9 MG/ML
20 INJECTION INTRAVENOUS ONCE
Status: COMPLETED | OUTPATIENT
Start: 2022-08-19 | End: 2022-08-19

## 2022-08-19 RX ORDER — SODIUM CHLORIDE, SODIUM LACTATE, POTASSIUM CHLORIDE, CALCIUM CHLORIDE 600; 310; 30; 20 MG/100ML; MG/100ML; MG/100ML; MG/100ML
INJECTION, SOLUTION INTRAVENOUS CONTINUOUS
Status: DISCONTINUED | OUTPATIENT
Start: 2022-08-19 | End: 2022-08-19

## 2022-08-19 RX ORDER — LEVOFLOXACIN 5 MG/ML
INJECTION, SOLUTION INTRAVENOUS
Status: DISCONTINUED
Start: 2022-08-19 | End: 2022-08-19

## 2022-08-19 RX ORDER — ONDANSETRON 2 MG/ML
4 INJECTION INTRAMUSCULAR; INTRAVENOUS ONCE
Status: COMPLETED | OUTPATIENT
Start: 2022-08-19 | End: 2022-08-19

## 2022-08-19 RX ORDER — NALOXONE HYDROCHLORIDE 0.4 MG/ML
80 INJECTION, SOLUTION INTRAMUSCULAR; INTRAVENOUS; SUBCUTANEOUS AS NEEDED
Status: DISCONTINUED | OUTPATIENT
Start: 2022-08-19 | End: 2022-08-19

## 2022-08-19 RX ORDER — LIDOCAINE HYDROCHLORIDE 20 MG/ML
10 SOLUTION OROPHARYNGEAL ONCE
Status: COMPLETED | OUTPATIENT
Start: 2022-08-19 | End: 2022-08-19

## 2022-08-19 RX ORDER — SODIUM CHLORIDE 9 MG/ML
INJECTION, SOLUTION INTRAVENOUS CONTINUOUS
Status: DISCONTINUED | OUTPATIENT
Start: 2022-08-19 | End: 2022-08-19

## 2022-08-19 RX ORDER — IOHEXOL 350 MG/ML
100 INJECTION, SOLUTION INTRAVENOUS
Status: COMPLETED | OUTPATIENT
Start: 2022-08-19 | End: 2022-08-19

## 2022-08-19 RX ORDER — MAGNESIUM HYDROXIDE/ALUMINUM HYDROXICE/SIMETHICONE 120; 1200; 1200 MG/30ML; MG/30ML; MG/30ML
30 SUSPENSION ORAL ONCE
Status: COMPLETED | OUTPATIENT
Start: 2022-08-19 | End: 2022-08-19

## 2022-08-19 RX ORDER — ONDANSETRON 2 MG/ML
INJECTION INTRAMUSCULAR; INTRAVENOUS
Status: COMPLETED
Start: 2022-08-19 | End: 2022-08-19

## 2022-08-19 RX ADMIN — SODIUM CHLORIDE, SODIUM LACTATE, POTASSIUM CHLORIDE, CALCIUM CHLORIDE: 600; 310; 30; 20 INJECTION, SOLUTION INTRAVENOUS at 10:19:00

## 2022-08-19 NOTE — ANESTHESIA POSTPROCEDURE EVALUATION
[address] [facility],2[address] Floor Patient Status:  Hospital Outpatient Surgery   Age/Gender 77year old female MRN [id number]   Location [address] [facility] 28 Attending Harris Ellis, [address] [facility] Day # 0 PCP Jony Ryan MD       Anesthesia Post-op Note    ENDOSCOPIC ULTRASOUND (EUS) WITH FINE NEEDLE ASPIRATION    Procedure Summary     Date: 08/19/22 Room / Location: [facility] ENDOSCOPY 02 / [facility] ENDOSCOPY    Anesthesia Start: 5976 Anesthesia Stop: 8904    Procedure: ENDOSCOPIC ULTRASOUND (EUS) WITH FINE NEEDLE ASPIRATION (N/A ) Diagnosis:       Pancreas cyst      (Pancreas cyst [K86.2])    Surgeons: Harris Ellis DO Anesthesiologist: Enrique Lange MD    Anesthesia Type: MAC ASA Status: 2          Anesthesia Type: MAC    Vitals Value Taken Time   /66 08/19/22 1019   Temp  08/19/22 1020   Pulse 54 08/19/22 1019   Resp 16 08/19/22 1019   SpO2 99 % 08/19/22 1019   Vitals shown include unvalidated device data. Patient Location: PACU    Anesthesia Type: MAC    Airway Patency: patent    Postop Pain Control: adequate    Mental Status: mildly sedated but able to meaningfully participate in the post-anesthesia evaluation    Nausea/Vomiting: none    Cardiopulmonary/Hydration status: stable euvolemic    Complications: no apparent anesthesia related complications    Postop vital signs: stable    Dental Exam: Unchanged from Preop    Patient to be discharged home when criteria met.

## 2022-10-18 PROBLEM — B35.1 ONYCHOMYCOSIS: Status: ACTIVE | Noted: 2018-01-23

## 2022-10-18 PROBLEM — R03.0 ELEVATED BLOOD-PRESSURE READING WITHOUT DIAGNOSIS OF HYPERTENSION: Status: ACTIVE | Noted: 2021-10-05

## 2022-10-18 PROBLEM — N28.9 ABNORMAL RENAL FUNCTION: Status: ACTIVE | Noted: 2022-07-11

## 2022-10-18 PROBLEM — E55.9 VITAMIN D DEFICIENCY: Status: ACTIVE | Noted: 2022-01-03

## 2022-10-18 PROBLEM — R16.0 LIVER MASS: Status: ACTIVE | Noted: 2022-08-04

## 2022-10-18 PROBLEM — I10 ESSENTIAL HYPERTENSION: Status: ACTIVE | Noted: 2022-02-02

## 2022-10-18 PROBLEM — E87.6 HYPOKALEMIA: Status: ACTIVE | Noted: 2022-07-13

## 2022-10-18 PROBLEM — L03.019 PARONYCHIA OF FINGER: Status: ACTIVE | Noted: 2022-07-13

## 2022-10-18 PROBLEM — Z91.09 ENVIRONMENTAL ALLERGIES: Status: ACTIVE | Noted: 2018-01-23

## 2022-10-18 PROBLEM — N81.10 VAGINAL WALL PROLAPSE: Status: ACTIVE | Noted: 2018-01-23

## 2022-10-18 PROBLEM — C50.919 MALIGNANT TUMOR OF BREAST (HCC): Status: ACTIVE | Noted: 2022-01-23

## 2022-10-18 PROBLEM — Z78.0 MENOPAUSE: Status: ACTIVE | Noted: 2018-01-23

## 2022-10-18 PROBLEM — M70.72 BURSITIS OF LEFT HIP: Status: ACTIVE | Noted: 2018-01-23

## 2023-03-08 NOTE — PROGRESS NOTES
Patient is here for MD f/u for Breast cancer. Patient had a MRI of the breast on 2/22 and a breast ultrasound on 3/6. Patient's last mammogram was in August 2022. She is feeling well.        Education Record    Learner:  Patient    Disease / Diagnosis:  Breast cancer     Barriers / Limitations:  None   Comments:    Method:  Discussion   Comments:    General Topics:  Plan of care reviewed   Comments:    Outcome:  Shows understanding   Comments:

## 2023-03-09 ENCOUNTER — OFFICE VISIT (OUTPATIENT)
Dept: HEMATOLOGY/ONCOLOGY | Age: 68
End: 2023-03-09
Attending: INTERNAL MEDICINE
Payer: MEDICARE

## 2023-03-09 VITALS
OXYGEN SATURATION: 98 % | DIASTOLIC BLOOD PRESSURE: 64 MMHG | RESPIRATION RATE: 16 BRPM | WEIGHT: 102 LBS | TEMPERATURE: 98 F | SYSTOLIC BLOOD PRESSURE: 106 MMHG | HEART RATE: 68 BPM | BODY MASS INDEX: 19 KG/M2

## 2023-03-09 DIAGNOSIS — K86.2 PANCREATIC CYST: ICD-10-CM

## 2023-03-09 DIAGNOSIS — Z85.3 HISTORY OF BREAST CANCER: Primary | ICD-10-CM

## 2023-03-09 PROCEDURE — 99213 OFFICE O/P EST LOW 20 MIN: CPT | Performed by: INTERNAL MEDICINE

## 2023-03-09 RX ORDER — POTASSIUM CHLORIDE 1500 MG/1
TABLET, FILM COATED, EXTENDED RELEASE ORAL
COMMUNITY
Start: 2023-01-09

## 2023-03-09 RX ORDER — DAPAGLIFLOZIN 10 MG/1
TABLET, FILM COATED ORAL
COMMUNITY
Start: 2023-01-17

## 2023-03-14 NOTE — PROGRESS NOTES
[facility]    PATIENT'S NAME: Shelly Burciaga   ATTENDING PHYSICIAN: Liliya Irvin M.D. PATIENT ACCOUNT #: [de-identified] LOCATION: [address] [place] [address] RECORD #: [id number] YOB: 1955   DATE OF SERVICE: 03/09/2023       CANCER CENTER PROGRESS NOTE    CHIEF COMPLAINT:  Followup for history of distant breast cancer and subsequent issues with pancreatic cyst.    HISTORY OF PRESENT ILLNESS:  The patient is a 71-year-old female. She has a distant history of breast cancer which has been active for some time. She has been off all therapy for this. She does have osteoporosis. She was on antiresorptive therapy for some time and then took a break. She also has a history of iron deficiency, and she underwent a GI evaluation with Dr. Amy Aguila. She has had upper and lower endoscopies. She has had a pancreatic cyst that was found incidentally, and this has been followed by MRI and periodic EUS. He did want another EUS on her last summer. She had a needle aspiration of the cyst.  At this point, it is felt to be benign. She sees Dr. Nury Feldman for her primary care. She is up to date with regard to her imaging. Her last mammogram was done through [facility] August 1, 2022. She does have some mild nonspecific pulmonary nodules that were seen on a CT angiogram in August 2022. MEDICATIONS:  Current medications include Farxiga 10 mg daily; fluticasone propionate nasal spray p.r.n.; magnesium sulfate daily; metoprolol succinate extended release 75 mg daily; multivitamin daily; potassium chloride 20 mEq 2 tablets in the morning and 1 tablet in the evening; Aleve p.r.n.; Entresto 24-26 one tablet twice daily; tramadol 50 mg q.6 h. p.r.n.; turmeric 500 mg 2 capsules daily; and zinc sulfate daily. PHYSICAL EXAMINATION:    GENERAL:  She is a well-appearing female in no acute distress. VITAL SIGNS:  Her performance status is 0. Her weight is 102 pounds.   Blood pressure is 106/64, pulse 68, respiratory rate is 20, temperature is 97.5. HEENT:  Unremarkable. LYMPHATICS:  She has no adenopathy. LUNGS:  Clear. HEART:  Normal.  ABDOMEN:  No hepatosplenomegaly or tenderness. EXTREMITIES:  She has no clubbing, cyanosis, or edema. NEUROLOGIC:  She is intact. IMPRESSION:    1. Distant history of breast cancer. I told her she does not need any specific followup other than regular mammography. This can be ordered by her primary care physician in the future. 2.   History of pancreatic cyst.  It was reviewed last year. She ended up going through an EUS, and biopsy sampled by EUS was considered low risk. She is going to continue to follow up with Dr. Kerline Goncalves. I informed her I am going to be retiring. I told her she does not necessarily need to see an oncologist in the future but my colleagues remain available to her if there is an issue. Dictated By Gloria Koo M.D.  d: 03/14/2023 08:37:26  t: 03/14/2023 11:42:52  Job [id number]  /    cc: EMORY Us

## 2023-05-01 NOTE — OPERATIVE REPORT
[facility] Patient Status:  Hospital Outpatient Surgery    1955 MRN [id number]   [facility] ENDOSCOPY Attending Ashley Smith DO   Hosp Day # 0 PCP Lis Crane MD       PREOPERATIVE DIAGNOSIS/INDICATION: Iron RECOMMENDATIONS:    1. Follow up pathology results. 2. Stop omeprazole, start pantoprazole 40 mg twice daily x 8 weeks. 3. Repeat EGD in 8 weeks. 4. Avoid NSAIDs, f/u w/ PCP to assess if asa required.      Emely Burnett  Gastroenterology/Advanced E Post-Care Instructions: I reviewed with the patient in detail post-care instructions. Patient is not to engage in any heavy lifting, exercise, or swimming for the next 14 days. Should the patient develop any fevers, chills, bleeding, severe pain patient will contact the office immediately.

## 2024-01-29 ENCOUNTER — HOSPITAL ENCOUNTER (OUTPATIENT)
Dept: GENERAL RADIOLOGY | Age: 69
Discharge: HOME | End: 2024-01-29
Attending: INTERNAL MEDICINE
Payer: MEDICARE

## 2024-01-29 DIAGNOSIS — M79.606 LEG PAIN: ICD-10-CM

## 2024-01-29 PROCEDURE — 73590 X-RAY EXAM OF LOWER LEG: CPT | Performed by: INTERNAL MEDICINE

## 2024-05-03 PROBLEM — J04.0 LARYNGITIS: Status: ACTIVE | Noted: 2018-05-21

## 2024-05-03 PROBLEM — Z51.81 MEDICATION MONITORING ENCOUNTER: Status: ACTIVE | Noted: 2022-01-04

## 2024-05-03 PROBLEM — G90.9 AUTONOMIC NEUROPATHY: Status: ACTIVE | Noted: 2024-04-04

## 2024-05-03 PROBLEM — E78.2 MIXED HYPERLIPIDEMIA: Status: ACTIVE | Noted: 2023-12-06

## 2024-05-03 PROBLEM — R19.7 DIARRHEA: Status: ACTIVE | Noted: 2024-04-25

## 2024-05-03 PROBLEM — E61.1 IRON DEFICIENCY: Status: ACTIVE | Noted: 2023-12-06

## 2024-05-05 ENCOUNTER — HOSPITAL ENCOUNTER (EMERGENCY)
Age: 69
Discharge: HOME | End: 2024-05-05
Attending: EMERGENCY MEDICINE
Payer: MEDICARE

## 2024-05-05 VITALS
RESPIRATION RATE: 16 BRPM | HEART RATE: 52 BPM | HEIGHT: 61 IN | WEIGHT: 106 LBS | SYSTOLIC BLOOD PRESSURE: 145 MMHG | BODY MASS INDEX: 20.01 KG/M2 | OXYGEN SATURATION: 100 % | TEMPERATURE: 98 F | DIASTOLIC BLOOD PRESSURE: 72 MMHG

## 2024-05-05 DIAGNOSIS — K62.3 RECTAL PROLAPSE: Primary | ICD-10-CM

## 2024-05-05 LAB
ALBUMIN SERPL-MCNC: 2.9 G/DL (ref 3.4–5)
ALBUMIN/GLOB SERPL: 0.8 {RATIO} (ref 1–2)
ALP LIVER SERPL-CCNC: 165 U/L
ALT SERPL-CCNC: 34 U/L
ANION GAP SERPL CALC-SCNC: 6 MMOL/L (ref 0–18)
APTT PPP: 25.8 SECONDS (ref 23.3–35.6)
AST SERPL-CCNC: 22 U/L (ref 15–37)
BASOPHILS # BLD AUTO: 0.01 X10(3) UL (ref 0–0.2)
BASOPHILS NFR BLD AUTO: 0.1 %
BILIRUB SERPL-MCNC: 0.4 MG/DL (ref 0.1–2)
BUN BLD-MCNC: 17 MG/DL (ref 9–23)
CALCIUM BLD-MCNC: 9 MG/DL (ref 8.5–10.1)
CHLORIDE SERPL-SCNC: 103 MMOL/L (ref 98–112)
CO2 SERPL-SCNC: 25 MMOL/L (ref 21–32)
CREAT BLD-MCNC: 0.66 MG/DL
EGFRCR SERPLBLD CKD-EPI 2021: 95 ML/MIN/1.73M2 (ref 60–?)
EOSINOPHIL # BLD AUTO: 0.48 X10(3) UL (ref 0–0.7)
EOSINOPHIL NFR BLD AUTO: 7 %
ERYTHROCYTE [DISTWIDTH] IN BLOOD BY AUTOMATED COUNT: 13.9 %
GLOBULIN PLAS-MCNC: 3.6 G/DL (ref 2.8–4.4)
GLUCOSE BLD-MCNC: 127 MG/DL (ref 70–99)
HCT VFR BLD AUTO: 36.7 %
HGB BLD-MCNC: 12.7 G/DL
IMM GRANULOCYTES # BLD AUTO: 0.05 X10(3) UL (ref 0–1)
IMM GRANULOCYTES NFR BLD: 0.7 %
INR BLD: 0.89 (ref 0.8–1.2)
LYMPHOCYTES # BLD AUTO: 1.17 X10(3) UL (ref 1–4)
LYMPHOCYTES NFR BLD AUTO: 17 %
MCH RBC QN AUTO: 29.5 PG (ref 26–34)
MCHC RBC AUTO-ENTMCNC: 34.6 G/DL (ref 31–37)
MCV RBC AUTO: 85.2 FL
MONOCYTES # BLD AUTO: 0.55 X10(3) UL (ref 0.1–1)
MONOCYTES NFR BLD AUTO: 8 %
NEUTROPHILS # BLD AUTO: 4.64 X10 (3) UL (ref 1.5–7.7)
NEUTROPHILS # BLD AUTO: 4.64 X10(3) UL (ref 1.5–7.7)
NEUTROPHILS NFR BLD AUTO: 67.2 %
OSMOLALITY SERPL CALC.SUM OF ELEC: 281 MOSM/KG (ref 275–295)
PLATELET # BLD AUTO: 235 10(3)UL (ref 150–450)
POTASSIUM SERPL-SCNC: 4 MMOL/L (ref 3.5–5.1)
PROT SERPL-MCNC: 6.5 G/DL (ref 6.4–8.2)
PROTHROMBIN TIME: 12.1 SECONDS (ref 11.6–14.8)
RBC # BLD AUTO: 4.31 X10(6)UL
SODIUM SERPL-SCNC: 134 MMOL/L (ref 136–145)
WBC # BLD AUTO: 6.9 X10(3) UL (ref 4–11)

## 2024-05-05 PROCEDURE — 80053 COMPREHEN METABOLIC PANEL: CPT | Performed by: EMERGENCY MEDICINE

## 2024-05-05 PROCEDURE — 85610 PROTHROMBIN TIME: CPT | Performed by: EMERGENCY MEDICINE

## 2024-05-05 PROCEDURE — 99284 EMERGENCY DEPT VISIT MOD MDM: CPT

## 2024-05-05 PROCEDURE — 85730 THROMBOPLASTIN TIME PARTIAL: CPT | Performed by: EMERGENCY MEDICINE

## 2024-05-05 PROCEDURE — 85025 COMPLETE CBC W/AUTO DIFF WBC: CPT | Performed by: EMERGENCY MEDICINE

## 2024-05-05 PROCEDURE — 96360 HYDRATION IV INFUSION INIT: CPT

## 2024-05-05 RX ORDER — SODIUM CHLORIDE 9 MG/ML
INJECTION, SOLUTION INTRAVENOUS ONCE
Status: COMPLETED | OUTPATIENT
Start: 2024-05-05 | End: 2024-05-05

## 2024-05-05 RX ORDER — DOCUSATE SODIUM 100 MG/1
100 CAPSULE, LIQUID FILLED ORAL 2 TIMES DAILY
Qty: 30 CAPSULE | Refills: 0 | Status: SHIPPED | OUTPATIENT
Start: 2024-05-05

## 2024-05-05 NOTE — DISCHARGE INSTRUCTIONS
Rest at home  Continue with increased fiber in diet at home  Return to the ER if symptoms worsen or if any other problems arise

## 2024-05-05 NOTE — ED PROVIDER NOTES
Patient Seen in: [facility] In [place]      History     Chief Complaint   Patient presents with    Anal Problem     Stated Complaint: prolapsed hemmorhoids    Subjective:   HPI    Patient is a 68-year-old female presents emergency room with a history of what she believes was prolapsed hemorrhoids which has been ongoing since yesterday she has been having some ongoing lower back pain that has been ongoing for some time that she believes happened when she strained herself she had some diarrhea this week and was diagnosed with a E. coli infection in her stool for which she was just treated with supportive care she was seen by her primary care doctor and also her gastroenterologist for the symptoms earlier this week.  The patient denies history of any vomiting at this time.  The patient's loose stools improved.  The patient denies abdominal pain at this time.  Patient primarily complaining of anal discomfort at this time.  The patient has had a history of a rectal prolapse in the past as well for which she has had surgery through [facility] many years ago.    Objective:   Past Medical History:    Abdominal distention    Anemia    Anesthesia complication    Arthritis    Back pain    Back problem    Bloating    Blood disorder    anemia    Breast CA (HCC)    right    Cardiomyopathy (HCC)    Diarrhea, unspecified    Disorder of liver    MASS ON LIVER    Esophageal reflux    Exposure to medical diagnostic radiation    Flatulence/gas pain/belching    Food intolerance    Hemorrhoids    High blood pressure    High cholesterol    History of stomach ulcers    Indigestion    Leg swelling    Osteoarthritis    hips and back    Osteoporosis    Personal history of antineoplastic chemotherapy    PONV (postoperative nausea and vomiting)    Slow heart beat    Slow pumping heart    Uncomfortable fullness after meals    Venous thrombosis    Many years ago, not blood clot per patient    Visual impairment    contacts,  glasses              Past Surgical History:   Procedure Laterality Date    Breast biopsy      Cholecystectomy      Colonoscopy      Colonoscopy N/A 9/18/2019    Procedure: COLONOSCOPY;  Surgeon: Basil Raya DO;  Location:  ENDOSCOPY    Egd      Ercp,diagnostic      Hernia surgery      Hysterectomy      Lumpectomy right      Other      right and left heart cath    Other Right     leg vein surgery    Port, indwelling, imp      removed s/p chemo    Removal gallbladder      Sinus surgery        Total abdom hysterectomy                  Social History     Socioeconomic History    Marital status:    Tobacco Use    Smoking status: Never    Smokeless tobacco: Never   Vaping Use    Vaping status: Never Used   Substance and Sexual Activity    Alcohol use: Yes     Comment: rarely, 4x/year    Drug use: No     Social Determinants of Health      Received from [facility]    Social Connections    Received from [facility], [facility]    Housing Stability              Review of Systems    Positive for stated complaint: prolapsed hemmorhoids  Other systems are as noted in HPI.  Constitutional and vital signs reviewed.      All other systems reviewed and negative except as noted above.    Physical Exam     ED Triage Vitals [05/05/24 1206]   /76   Pulse 62   Resp 18   Temp 97.9 °F (36.6 °C)   Temp src Oral   SpO2 100 %   O2 Device None (Room air)       Current:/72   Pulse 52   Temp 97.9 °F (36.6 °C) (Oral)   Resp 16   Ht 154.9 cm (5' 1\")   Wt 48.1 kg   SpO2 100%   BMI 20.03 kg/m²         Physical Exam    GENERAL: Well-developed, well-nourished female sitting up breathing easily in no apparent distress.  Patient is nontoxic in appearance.  HEENT: Head is normocephalic, atraumatic. Pupils are 4 mm equally round and reactive to light. Oropharynx is clear. Mucous membranes are moist.  LUNGS: Clear to auscultation  bilaterally with no wheeze. There is good equal air entry bilaterally.  HEART: Regular rate and rhythm. Normal S1, S2 no S3, or S4. No murmur.  ABDOMEN: There is no focal tenderness to palpation appreciated anywhere throughout the abdomen. There is no guarding, no rebound, no mass, and no organomegaly appreciated. There is normoactive bowel sounds.  RECTAL: With female chaperone present the patient had anal exam done here in the ER which showed evidence of a rectal prolapse with no evidence of any bleeding appreciated no evidence of any obvious mass appreciated.  The patient's prolapse was easily reduced here by myself with direct pressure.  EXTREMITIES: There is no cyanosis, clubbing, or edema appreciated. Pulses are 2+ and equal in all 4 extremities.  NEURO: Patient is awake, alert and oriented to time place and person. Motor strength is 5 over 5 in all 4 extremities. There are no gross motor or sensory deficits appreciated.  Patient answering all questions appropriately.        ED Course     Labs Reviewed   COMP METABOLIC PANEL (14) - Abnormal; Notable for the following components:       Result Value    Glucose 127 (*)     Sodium 134 (*)     Alkaline Phosphatase 165 (*)     Albumin 2.9 (*)     A/G Ratio 0.8 (*)     All other components within normal limits   PROTHROMBIN TIME (PT) - Normal   PTT, ACTIVATED - Normal   CBC WITH DIFFERENTIAL WITH PLATELET    Narrative:     The following orders were created for panel order CBC With Differential With Platelet.  Procedure                               Abnormality         Status                     ---------                               -----------         ------                     CBC W/ DIFFERENTIAL[[id number]]                              Final result                 Please view results for these tests on the individual orders.   CBC W/ DIFFERENTIAL                      [facility]          14:40 patient sitting back breathing easily in no apparent distress this time.   Patient feeling fine at this time and wants to go home.  Patient be followed up with general surgery I did speak with Dr. Lynch who recommended following up with Dr. Epps the patient be discharged home at this time.      Patient has had a previous rectal prolapse surgery several years ago at [facility] as per patient.  Patient did have rectal prolapse today that was easily reduced here in the emergency room.  Patient had blood work drawn including a CBC, chemistries, BUN/creatinine, and blood sugar which showed no significant abnormality noted.  Liver function test found to be negative.  Coags were unremarkable.  The patient was given a liter of IV fluid she admits she has been taking some Imodium recently secondary to some diarrhea that she was having earlier this week it is uncertain whether this may have been in part the cause of her prolapse.  The patient has been instructed to encourage fiber in her diet and a bland diet at home she will need follow-up with general surgery regarding her symptoms.  Patient felt much better after IV fluid was given and was eager to go home at this time.  Patient's case has been discussed with general surgery as noted above and the patient will be instructed to encourage fluids and rest at home and return to the ER immediately if symptoms worsen or if any other problems arise.  Patient discharged home at this time.                             Medical Decision Making      Disposition and Plan     Clinical Impression:  1. Rectal prolapse         Disposition:  Discharge  5/5/2024  2:41 pm    Follow-up:  Tunde Epps MD  [address] [facility] [address]  [phone]    Follow up in 2 day(s)  please call for follow up this week          Medications Prescribed:  Discharge Medication List as of 5/5/2024  2:43 PM        START taking these medications    Details   docusate sodium 100 MG Oral Cap Take 1 capsule (100 mg total) by mouth 2 (two) times daily., Normal, Disp-30  capsule, R-0

## 2024-05-06 ENCOUNTER — HOSPITAL ENCOUNTER (OUTPATIENT)
Dept: GENERAL RADIOLOGY | Age: 69
Discharge: HOME | End: 2024-05-06
Attending: NURSE PRACTITIONER
Payer: MEDICARE

## 2024-05-06 DIAGNOSIS — M54.50 LOW BACK PAIN: ICD-10-CM

## 2024-05-06 PROCEDURE — 72100 X-RAY EXAM L-S SPINE 2/3 VWS: CPT | Performed by: NURSE PRACTITIONER

## 2024-05-09 ENCOUNTER — OFFICE VISIT (OUTPATIENT)
Facility: LOCATION | Age: 69
End: 2024-05-09
Payer: MEDICARE

## 2024-05-09 VITALS — TEMPERATURE: 97 F

## 2024-05-09 DIAGNOSIS — K62.3 RECTAL PROLAPSE: Primary | ICD-10-CM

## 2024-05-09 PROCEDURE — 46600 DIAGNOSTIC ANOSCOPY SPX: CPT | Performed by: STUDENT IN AN ORGANIZED HEALTH CARE EDUCATION/TRAINING PROGRAM

## 2024-05-09 PROCEDURE — 99203 OFFICE O/P NEW LOW 30 MIN: CPT | Performed by: STUDENT IN AN ORGANIZED HEALTH CARE EDUCATION/TRAINING PROGRAM

## 2024-05-10 NOTE — H&P
New Patient Visit Note       Active Problems      1. Rectal prolapse        Chief Complaint   Chief Complaint   Patient presents with    New Patient     NP- 5/5 ER f/u for Rectal Prolapse- pt states rectal prolapse surgery 2-3 years ago at [facility], E-coli infection a week ago, denies bleeding just spotting        History of Present Illness   This is a very nice 68-year-old female who presents to me with concern for recurrent rectal prolapse.  Patient has a history of rectal prolapse which was repaired by Dr. Heart at [facility] and 2019 through a robotic ventral mesh rectopexy.  About a week ago, patient began having diarrhea and was diagnosed with E. coli.  She experienced recurrent prolapse of tissue through her anus associated with pain/discomfort prompting her to go to the emergency room on 5/5/2024.  Per evaluation by Dr. Carter in the emergency room, patient had rectal prolapse that was easily reduced.  She was instructed to follow-up with me.    Patient's most recent colonoscopy was in 2019 and she is due for repeat colonoscopy in 2024.  She also has a history of symptomatic hemorrhoids and been treated with rubber band ligation by Dr. Heart.  She tends to have diarrhea at baseline and takes Imodium as needed.  She also notes vaginal prolapse for years that has never been repaired.  She does not have a gynecologist.  She denies any rectal bleeding or pain currently.  She states she is a runner but has been not running lately due to fear of recurrent prolapse.  She denies any fecal incontinence.  She has rare urinary incontinence.  She does not take any blood thinners.  She states she followed up with me rather than Dr. Heart because she lives in [place] and 10 minutes away from my office and would prefer to have her care at [place] if possible.      Allergies  Татьяна is allergic to wheat gluten, lactose, and penicillins.    Past Medical / Surgical / Social / Family History    The past medical and past  surgical history have been reviewed by me today.    Past Medical History:    Abdominal distention    Anemia    Anesthesia complication    Arthritis    Back pain    Back problem    Bloating    Blood disorder    anemia    Breast CA (HCC)    right    Cardiomyopathy (HCC)    Diarrhea, unspecified    Disorder of liver    MASS ON LIVER    Esophageal reflux    Exposure to medical diagnostic radiation    Flatulence/gas pain/belching    Food intolerance    Hemorrhoids    High blood pressure    High cholesterol    History of stomach ulcers    Indigestion    Leg swelling    Osteoarthritis    hips and back    Osteoporosis    Personal history of antineoplastic chemotherapy    PONV (postoperative nausea and vomiting)    Slow heart beat    Slow pumping heart    Uncomfortable fullness after meals    Venous thrombosis    Many years ago, not blood clot per patient    Visual impairment    contacts, glasses     Past Surgical History:   Procedure Laterality Date    Breast biopsy      Cholecystectomy      Colonoscopy      Colonoscopy N/A 9/18/2019    Procedure: COLONOSCOPY;  Surgeon: Basil Raya DO;  Location:  ENDOSCOPY    [facility]      Ercp,diagnostic      Hernia surgery      Hysterectomy      Lumpectomy right      Other      right and left heart cath    Other Right     leg vein surgery    Port, indwelling, imp      removed s/p chemo    Removal gallbladder      Sinus surgery        Total abdom hysterectomy         The family history and social history have been reviewed by me today.    Family History   Problem Relation Age of Onset    Stroke Father     Heart Attack Father     Colon Cancer Other     Breast Cancer Other     Heart Attack Other      Social History     Socioeconomic History    Marital status:    Tobacco Use    Smoking status: Never    Smokeless tobacco: Never   Vaping Use    Vaping status: Never Used   Substance and Sexual Activity    Alcohol use: Yes     Comment: rarely, 4x/year    Drug use: No        Current  Outpatient Medications:     docusate sodium 100 MG Oral Cap, Take 1 capsule (100 mg total) by mouth 2 (two) times daily., Disp: 30 capsule, Rfl: 0    atorvastatin 10 MG Oral Tab, Take 1 tablet (10 mg total) by mouth daily., Disp: , Rfl:     PEG 3350-KCl-NaBcb-NaCl-NaSulf (PEG-3350/ELECTROLYTES) 236 g Oral Recon Soln, Use as directed by physician., Disp: 4000 mL, Rfl: 0    FARXIGA 10 MG Oral Tab, , Disp: , Rfl:     Potassium Chloride ER 20 MEQ Oral Tab CR, TAKE 2 TABLETS BY MOUTH EVERY MORNING AND TAKE 1 TABLET BY MOUTH EVERY AFTERNOON., Disp: , Rfl:     Multiple Vitamin (MULTIVITAMIN ADULT OR), Take by mouth., Disp: , Rfl:     Zinc Sulfate (ZINC 15 OR), Take by mouth., Disp: , Rfl:     MAGNESIUM SULFATE OR, magnesium sulfate, Disp: , Rfl:     Pseudoephedrine-Naproxen Na (ALEVE-D SINUS & COLD OR), , Disp: , Rfl:     traMADol HCl 50 MG Oral Tab, Take 1 tablet (50 mg total) by mouth every 6 (six) hours as needed for Pain., Disp: , Rfl:     Metoprolol Succinate ER 25 MG Oral Tablet 24 Hr, Total 75 mg, Disp: , Rfl: 1    Metoprolol Succinate ER 50 MG Oral Tablet 24 Hr, Total 75 mg, Disp: , Rfl: 3    sacubitril-valsartan 24-26 MG Oral Tab, TK 1 T PO BID, Disp: , Rfl:     Turmeric 500 MG Oral Tab, Take by mouth daily.  , Disp: , Rfl:     Fluticasone Propionate 50 MCG/ACT Nasal Suspension, SHAKE LQ AND U 2 SPRAYS IEN D, Disp: , Rfl: 5      Review of Systems  A 10 point review of systems was performed and negative unless otherwise documented per HPI.    Physical Findings   Temp 96.5 °F (35.8 °C) (Temporal)   Physical Exam  Vitals and nursing note reviewed. Exam conducted with a chaperone present.   Constitutional:       General: She is not in acute distress.  HENT:      Head: Normocephalic and atraumatic.      Mouth/Throat:      Mouth: Mucous membranes are moist.   Cardiovascular:      Rate and Rhythm: Normal rate and regular rhythm.   Pulmonary:      Effort: Pulmonary effort is normal.   Abdominal:      General: There is no  distension.      Palpations: Abdomen is soft.      Tenderness: There is no abdominal tenderness.   Genitourinary:     Comments: Patient examined in the prone jackknife position with female medical assistant chaperone present.  External exam of the anus is normal.  Digital rectal exam reveals poor tone and squeeze without any masses, bleeding, drainage or tenderness.  Anoscopy reveals healthy appearing and redundant distal rectal mucosa.    Patient refused examination on the commode after Valsalva maneuver.  There is a large vaginal prolapse of what appears to be bladder prolapsing through the anterior vaginal wall  Musculoskeletal:         General: No deformity.   Skin:     General: Skin is warm and dry.   Neurological:      General: No focal deficit present.      Mental Status: She is alert.   Psychiatric:         Mood and Affect: Mood normal.             Assessment   1. Rectal prolapse        This is a very nice 68-year-old female who presents to me with concern for recurrent rectal prolapse.  Patient has a history of rectal prolapse which was repaired by Dr. Heart at [place] and 2019 through a robotic ventral mesh rectopexy.  About a week ago, patient began having diarrhea and was diagnosed with E. coli.  She experienced recurrent prolapse of tissue through her anus associated with pain/discomfort prompting her to go to the emergency room on 5/5/2024.  Per evaluation by Dr. Carter in the emergency room, patient had rectal prolapse that was easily reduced.  She was instructed to follow-up with me.    Patient's most recent colonoscopy was in 2019 and she is due for repeat colonoscopy in 2024.  She also has a history of symptomatic hemorrhoids and been treated with rubber band ligation by Dr. Heart.  She tends to have diarrhea at baseline and takes Imodium as needed.  She also notes vaginal prolapse for years that has never been repaired.  She does not have a gynecologist.  She denies any rectal bleeding or pain  currently.  She states she is a runner but has been not running lately due to fear of recurrent prolapse.  She denies any fecal incontinence.  She has rare urinary incontinence.  She does not take any blood thinners.  She states she followed up with me rather than Dr. Heart because she lives in [place] and 10 minutes away from my office and would prefer to have her care at [facility] if possible.    External exam of the anus is normal.  Digital rectal exam reveals poor tone and squeeze without any masses, bleeding, drainage or tenderness.  Anoscopy reveals healthy appearing and redundant distal rectal mucosa.    Patient refused examination on the commode after Valsalva maneuver.  There is a large vaginal prolapse of what appears to be bladder prolapsing through the anterior vaginal wall    Plan  It is suspected that the patient has recurrent rectal prolapse.  Patient refused examination on the commode after Valsalva to confirm the diagnosis.  Nonetheless, I strongly recommend the patient follows up with her operative surgeon, Dr. Heart.  Patient expressed understanding and was agreeable to follow-up with Dr. Heart.  She is welcome to follow-up with me as needed.     No orders of the defined types were placed in this encounter.      Imaging & Referrals   None    Follow Up  No follow-ups on file.    Tunde Epps MD

## 2024-05-10 NOTE — PROCEDURES
Procedure: Anoscopy    Surgeon: Tunde Epps    Anesthesia: None    Findings: See the progress note attached for all findings    Operative Summary: The patient was placed in a prone position on the proctoscopy table, the hips were flexed in the jackknife position.    Using a well-lubricated finger, digital rectal exam was performed in anticipation of the anoscope.    The anoscope was then inserted under direct visualization.    Excellent visualization was performed in a 360° fashion.    The scope was removed. The patient was taken out of the prone, jackknife, position.     The patient tolerated the procedure well.

## 2024-05-30 ENCOUNTER — APPOINTMENT (OUTPATIENT)
Dept: CT IMAGING | Age: 69
End: 2024-05-30
Attending: STUDENT IN AN ORGANIZED HEALTH CARE EDUCATION/TRAINING PROGRAM
Payer: MEDICARE

## 2024-05-30 ENCOUNTER — HOSPITAL ENCOUNTER (EMERGENCY)
Age: 69
Discharge: HOME | End: 2024-05-30
Attending: STUDENT IN AN ORGANIZED HEALTH CARE EDUCATION/TRAINING PROGRAM
Payer: MEDICARE

## 2024-05-30 VITALS
BODY MASS INDEX: 19.63 KG/M2 | TEMPERATURE: 99 F | DIASTOLIC BLOOD PRESSURE: 53 MMHG | RESPIRATION RATE: 19 BRPM | SYSTOLIC BLOOD PRESSURE: 104 MMHG | OXYGEN SATURATION: 98 % | HEIGHT: 61 IN | HEART RATE: 67 BPM | WEIGHT: 104 LBS

## 2024-05-30 DIAGNOSIS — K52.9 ENTEROCOLITIS: Primary | ICD-10-CM

## 2024-05-30 LAB
ALBUMIN SERPL-MCNC: 3 G/DL (ref 3.4–5)
ALBUMIN/GLOB SERPL: 1 {RATIO} (ref 1–2)
ALP LIVER SERPL-CCNC: 118 U/L
ALT SERPL-CCNC: 21 U/L
ANION GAP SERPL CALC-SCNC: 3 MMOL/L (ref 0–18)
AST SERPL-CCNC: 14 U/L (ref 15–37)
BASOPHILS # BLD AUTO: 0.01 X10(3) UL (ref 0–0.2)
BASOPHILS NFR BLD AUTO: 0.1 %
BILIRUB SERPL-MCNC: 0.6 MG/DL (ref 0.1–2)
BILIRUB UR QL STRIP.AUTO: NEGATIVE
BUN BLD-MCNC: 15 MG/DL (ref 9–23)
CALCIUM BLD-MCNC: 8.9 MG/DL (ref 8.5–10.1)
CHLORIDE SERPL-SCNC: 105 MMOL/L (ref 98–112)
CLARITY UR REFRACT.AUTO: CLEAR
CO2 SERPL-SCNC: 29 MMOL/L (ref 21–32)
COLOR UR AUTO: YELLOW
CREAT BLD-MCNC: 0.84 MG/DL
EGFRCR SERPLBLD CKD-EPI 2021: 76 ML/MIN/1.73M2 (ref 60–?)
EOSINOPHIL # BLD AUTO: 0.06 X10(3) UL (ref 0–0.7)
EOSINOPHIL NFR BLD AUTO: 0.9 %
ERYTHROCYTE [DISTWIDTH] IN BLOOD BY AUTOMATED COUNT: 14.9 %
GLOBULIN PLAS-MCNC: 2.9 G/DL (ref 2.8–4.4)
GLUCOSE BLD-MCNC: 136 MG/DL (ref 70–99)
GLUCOSE UR STRIP.AUTO-MCNC: >=1000 MG/DL
HCT VFR BLD AUTO: 36.8 %
HGB BLD-MCNC: 12.2 G/DL
IMM GRANULOCYTES # BLD AUTO: 0.03 X10(3) UL (ref 0–1)
IMM GRANULOCYTES NFR BLD: 0.4 %
KETONES UR STRIP.AUTO-MCNC: NEGATIVE MG/DL
LEUKOCYTE ESTERASE UR QL STRIP.AUTO: NEGATIVE
LIPASE SERPL-CCNC: 17 U/L (ref 13–75)
LYMPHOCYTES # BLD AUTO: 0.97 X10(3) UL (ref 1–4)
LYMPHOCYTES NFR BLD AUTO: 14.4 %
MCH RBC QN AUTO: 29 PG (ref 26–34)
MCHC RBC AUTO-ENTMCNC: 33.2 G/DL (ref 31–37)
MCV RBC AUTO: 87.4 FL
MONOCYTES # BLD AUTO: 0.68 X10(3) UL (ref 0.1–1)
MONOCYTES NFR BLD AUTO: 10.1 %
NEUTROPHILS # BLD AUTO: 5 X10 (3) UL (ref 1.5–7.7)
NEUTROPHILS # BLD AUTO: 5 X10(3) UL (ref 1.5–7.7)
NEUTROPHILS NFR BLD AUTO: 74.1 %
NITRITE UR QL STRIP.AUTO: NEGATIVE
OSMOLALITY SERPL CALC.SUM OF ELEC: 287 MOSM/KG (ref 275–295)
PH UR STRIP.AUTO: 6 [PH] (ref 5–8)
PLATELET # BLD AUTO: 188 10(3)UL (ref 150–450)
POTASSIUM SERPL-SCNC: 3.8 MMOL/L (ref 3.5–5.1)
PROT SERPL-MCNC: 5.9 G/DL (ref 6.4–8.2)
PROT UR STRIP.AUTO-MCNC: NEGATIVE MG/DL
RBC # BLD AUTO: 4.21 X10(6)UL
RBC UR QL AUTO: NEGATIVE
SODIUM SERPL-SCNC: 137 MMOL/L (ref 136–145)
SP GR UR STRIP.AUTO: 1.01 (ref 1–1.03)
UROBILINOGEN UR STRIP.AUTO-MCNC: 0.2 MG/DL
WBC # BLD AUTO: 6.8 X10(3) UL (ref 4–11)

## 2024-05-30 PROCEDURE — 85025 COMPLETE CBC W/AUTO DIFF WBC: CPT | Performed by: STUDENT IN AN ORGANIZED HEALTH CARE EDUCATION/TRAINING PROGRAM

## 2024-05-30 PROCEDURE — 96361 HYDRATE IV INFUSION ADD-ON: CPT

## 2024-05-30 PROCEDURE — 74177 CT ABD & PELVIS W/CONTRAST: CPT | Performed by: STUDENT IN AN ORGANIZED HEALTH CARE EDUCATION/TRAINING PROGRAM

## 2024-05-30 PROCEDURE — 96374 THER/PROPH/DIAG INJ IV PUSH: CPT

## 2024-05-30 PROCEDURE — 81003 URINALYSIS AUTO W/O SCOPE: CPT | Performed by: STUDENT IN AN ORGANIZED HEALTH CARE EDUCATION/TRAINING PROGRAM

## 2024-05-30 PROCEDURE — 99284 EMERGENCY DEPT VISIT MOD MDM: CPT

## 2024-05-30 PROCEDURE — 80053 COMPREHEN METABOLIC PANEL: CPT | Performed by: STUDENT IN AN ORGANIZED HEALTH CARE EDUCATION/TRAINING PROGRAM

## 2024-05-30 PROCEDURE — 83690 ASSAY OF LIPASE: CPT | Performed by: STUDENT IN AN ORGANIZED HEALTH CARE EDUCATION/TRAINING PROGRAM

## 2024-05-30 RX ORDER — DICYCLOMINE HCL 20 MG
20 TABLET ORAL 4 TIMES DAILY PRN
Qty: 30 TABLET | Refills: 0 | Status: SHIPPED | OUTPATIENT
Start: 2024-05-30 | End: 2024-06-29

## 2024-05-30 RX ORDER — ONDANSETRON 2 MG/ML
4 INJECTION INTRAMUSCULAR; INTRAVENOUS ONCE
Status: COMPLETED | OUTPATIENT
Start: 2024-05-30 | End: 2024-05-30

## 2024-05-30 RX ORDER — DICYCLOMINE HCL 20 MG
20 TABLET ORAL ONCE
Status: COMPLETED | OUTPATIENT
Start: 2024-05-30 | End: 2024-05-30

## 2024-05-30 NOTE — ED PROVIDER NOTES
History     Chief Complaint   Patient presents with    Abdomen/Flank Pain       HPI    68 year old female with history of hypertension, hyperlipidemia, GERD, rectal prolapse, cardiomyopathy presents with diarrhea.  Patient states for months now she has had stool changes, diarrhea, bloating and occasional nausea.  She thinks she has gluten sensitivity, reports symptoms are much worse when she eats any gluten.  Few days ago she did have some crackers and she felt a couple days afterwards the symptoms began.  For the past 24 hours she has had about 10 liquid nonbloody bowel movements and bloating with nausea.  She feels weak and dehydrated.  Denies any family history of inflammatory bowel disease.  Reports colonoscopy 5 years ago with only polyps removed, no other concerning findings.  She is planning to have her rectal prolapse repaired surgically soon.  She denies any recent travel or antibiotic use.          Past Medical History:    Abdominal distention    Anemia    Anesthesia complication    Arthritis    Back pain    Back problem    Bloating    Blood disorder    anemia    Breast CA (HCC)    right    Cardiomyopathy (HCC)    Diarrhea, unspecified    Disorder of liver    MASS ON LIVER    Esophageal reflux    Exposure to medical diagnostic radiation    Flatulence/gas pain/belching    Food intolerance    Hemorrhoids    High blood pressure    High cholesterol    History of stomach ulcers    Indigestion    Leg swelling    Osteoarthritis    hips and back    Osteoporosis    Personal history of antineoplastic chemotherapy    PONV (postoperative nausea and vomiting)    Slow heart beat    Slow pumping heart    Uncomfortable fullness after meals    Venous thrombosis    Many years ago, not blood clot per patient    Visual impairment    contacts, glasses       Past Surgical History:   Procedure Laterality Date    Breast biopsy      Cholecystectomy      Colonoscopy      Colonoscopy N/A 9/18/2019    Procedure: COLONOSCOPY;   Surgeon: Basil Raya DO;  Location:  ENDOSCOPY    [facility]      Ercp,diagnostic      Hernia surgery      Hysterectomy      Lumpectomy right      Other      right and left heart cath    Other Right     leg vein surgery    Port, indwelling, imp      removed s/p chemo    Removal gallbladder      Sinus surgery        Total abdom hysterectomy         Social History     Socioeconomic History    Marital status:    Tobacco Use    Smoking status: Never    Smokeless tobacco: Never   Vaping Use    Vaping status: Never Used   Substance and Sexual Activity    Alcohol use: Yes     Comment: rarely, 4x/year    Drug use: No     Social Determinants of Health      [facility], [facility]    Social Connections    Received from [facility], [facility]    Housing Stability                   Physical Exam     ED Triage Vitals [05/30/24 1524]   BP (!) 89/57   Pulse 62   Resp 20   Temp 99.5 °F (37.5 °C)   Temp src Temporal   SpO2 100 %   O2 Device None (Room air)       Physical Exam  Constitutional:       General: She is not in acute distress.  Eyes:      Extraocular Movements: Extraocular movements intact.   Cardiovascular:      Rate and Rhythm: Normal rate.      Pulses: Normal pulses.   Pulmonary:      Effort: Pulmonary effort is normal. No respiratory distress.   Abdominal:      General: Abdomen is flat.      Palpations: Abdomen is soft.      Tenderness: There is abdominal tenderness.   Musculoskeletal:      Cervical back: Normal range of motion.   Neurological:      General: No focal deficit present.      Mental Status: She is alert.              ED Course     Labs Reviewed   URINALYSIS, ROUTINE - Abnormal; Notable for the following components:       Result Value    Glucose Urine >=1000 (*)     All other components within normal limits   COMP METABOLIC PANEL (14) - Abnormal; Notable for the following components:    Glucose 136 (*)      AST 14 (*)     Total Protein 5.9 (*)     Albumin 3.0 (*)     All other components within normal limits   CBC W/ DIFFERENTIAL - Abnormal; Notable for the following components:    Lymphocyte Absolute 0.97 (*)     All other components within normal limits   LIPASE - Normal   CBC WITH DIFFERENTIAL WITH PLATELET    Narrative:     The following orders were created for panel order CBC With Differential With Platelet.  Procedure                               Abnormality         Status                     ---------                               -----------         ------                     CBC W/ DIFFERENTIAL[[id number]]          Abnormal            Final result                 Please view results for these tests on the individual orders.   RAINBOW DRAW LAVENDER   RAINBOW DRAW LIGHT GREEN     CT ABDOMEN+PELVIS(CONTRAST ONLY)(CPT=74177)    Result Date: 5/30/2024  CONCLUSION:   1. Thickened edematous loop of bowel left upper quadrant, appears adjacent to but separate from the stomach, uncertain if this reflects left upper quadrant segment of colon or a small bowel loop, but reflects a focus of enteritis.  Suspect mild colitis of the ascending colon.  No large or drainable ascites, but small fluid all 4 quadrants.  No free air or ascites.  Hepatic periportal lucency.  Hepatic hemangioma.  Abnormal appearance of the sacrum may reflect insufficiency healing fractures.  Other findings above.    LOCATION:  [id number]   Dictated by (CST): Mo Rainey MD on 5/30/2024 at 4:51 PM     Finalized by (CST): Mo Rainey MD on 5/30/2024 at 4:58 PM            MDM     Vitals:    05/30/24 1524 05/30/24 1749 05/30/24 1856   BP: (!) 89/57 95/43 104/53   Pulse: 62 72 67   Resp: 20 16 19   Temp: 99.5 °F (37.5 °C) 98.5 °F (36.9 °C)    TempSrc: Temporal     SpO2: 100% 95% 98%   Weight: 47.2 kg     Height: 154.9 cm (5' 1\")         Abdominal cramps, nausea, diarrhea.  Possible enterocolitis, gastroenteritis, diverticulitis, symptoms may be from  gluten, she has not had a formal celiac testing done.    ED Course as of 05/30/24 2232  ------------------------------------------------------------  Time: 05/30 1744  Comment: Patient CT without free air.  Radiology is noting mild enterocolitis.  ------------------------------------------------------------  Time: 05/30 1748  Comment: Labs without leukocytosis, reassuring renal function and LFTs.  Lipase normal.  ------------------------------------------------------------  Time: 05/30 1748  Comment: On reevaluation patient is feeling improved, she is tolerating oral intake.  Discussed all findings.  Patient is feeling well to be discharged.  Give a prescription for Bentyl.  Discussed diet changes.  Follow-up with her gastroenterologist.  Vitals remain stable.  Ambulating without difficulty.  Given written and verbal instructions regarding this condition and strict return precautions.   Will follow up in clinic.   Patient verbalizes understanding of instructions and follow up plan, as well as indications to return to the emergency department.           Disposition and Plan     Clinical Impression:  1. Enterocolitis        Disposition:  Discharge    Follow-up:  Charles Santo MD  [address]  [address] [place] [id number]  [phone]    Follow up        Medications Prescribed:  Discharge Medication List as of 5/30/2024  7:12 PM        START taking these medications    Details   dicyclomine 20 MG Oral Tab Take 1 tablet (20 mg total) by mouth 4 (four) times daily as needed (diarrhea, abdominal cramps)., Normal, Disp-30 tablet, R-0

## 2024-06-03 ENCOUNTER — LAB ENCOUNTER (OUTPATIENT)
Dept: LAB | Age: 69
End: 2024-06-03
Payer: MEDICARE

## 2025-03-11 ENCOUNTER — APPOINTMENT (OUTPATIENT)
Dept: GENERAL RADIOLOGY | Age: 70
End: 2025-03-11
Attending: EMERGENCY MEDICINE
Payer: MEDICARE

## 2025-03-11 ENCOUNTER — APPOINTMENT (OUTPATIENT)
Dept: ULTRASOUND IMAGING | Age: 70
End: 2025-03-11
Attending: EMERGENCY MEDICINE
Payer: MEDICARE

## 2025-03-11 ENCOUNTER — HOSPITAL ENCOUNTER (EMERGENCY)
Age: 70
Discharge: HOME | End: 2025-03-11
Attending: EMERGENCY MEDICINE
Payer: MEDICARE

## 2025-03-11 VITALS
WEIGHT: 102 LBS | TEMPERATURE: 97 F | HEIGHT: 61 IN | BODY MASS INDEX: 19.26 KG/M2 | HEART RATE: 59 BPM | RESPIRATION RATE: 16 BRPM | SYSTOLIC BLOOD PRESSURE: 123 MMHG | OXYGEN SATURATION: 100 % | DIASTOLIC BLOOD PRESSURE: 68 MMHG

## 2025-03-11 DIAGNOSIS — M71.22 SYNOVIAL CYST OF LEFT POPLITEAL SPACE: ICD-10-CM

## 2025-03-11 DIAGNOSIS — M25.562 ACUTE PAIN OF LEFT KNEE: Primary | ICD-10-CM

## 2025-03-11 PROCEDURE — 99284 EMERGENCY DEPT VISIT MOD MDM: CPT

## 2025-03-11 PROCEDURE — 73560 X-RAY EXAM OF KNEE 1 OR 2: CPT | Performed by: EMERGENCY MEDICINE

## 2025-03-11 PROCEDURE — 93971 EXTREMITY STUDY: CPT | Performed by: EMERGENCY MEDICINE

## 2025-03-11 RX ORDER — EMPAGLIFLOZIN 10 MG/1
TABLET, FILM COATED ORAL
COMMUNITY

## 2025-03-11 NOTE — ED PROVIDER NOTES
Patient Seen in: [facility] [place]      History     Chief Complaint   Patient presents with    Swelling Edema     Stated Complaint: left leg swelling and pain since Friday    Subjective:   HPI      69-year-old female presents to the emergency department complaining of a 4-day history of left knee swelling and pain.  The patient is very physically active, running and doing weight work with her legs but denies any specific injury.  She has been taking ibuprofen without sustained symptomatic improvement.    Objective:     Past Medical History:    Abdominal distention    Anemia    Anesthesia complication    Arthritis    Back pain    Back problem    Bloating    Blood disorder    anemia    Breast CA (HCC)    right    Cardiomyopathy (HCC)    Diarrhea, unspecified    Disorder of liver    MASS ON LIVER    Esophageal reflux    Exposure to medical diagnostic radiation    Flatulence/gas pain/belching    Food intolerance    Hemorrhoids    High blood pressure    High cholesterol    History of stomach ulcers    Indigestion    Leg swelling    Osteoarthritis    hips and back    Osteoporosis    Personal history of antineoplastic chemotherapy    PONV (postoperative nausea and vomiting)    Slow heart beat    Slow pumping heart    Uncomfortable fullness after meals    Venous thrombosis    Many years ago, not blood clot per patient    Visual impairment    contacts, glasses              Past Surgical History:   Procedure Laterality Date    Breast biopsy      Cholecystectomy      Colonoscopy      Colonoscopy N/A 9/18/2019    Procedure: COLONOSCOPY;  Surgeon: Basil Raya DO;  Location:  ENDOSCOPY    [facility]      Ercp,diagnostic      Hernia surgery      Hysterectomy      Lumpectomy right      Other      right and left heart cath    Other Right     leg vein surgery    Port, indwelling, imp      removed s/p chemo    Removal gallbladder      Sinus surgery        Total abdom hysterectomy                  Social  History     Socioeconomic History    Marital status:    Tobacco Use    Smoking status: Never    Smokeless tobacco: Never   Vaping Use    Vaping status: Never Used   Substance and Sexual Activity    Alcohol use: Yes     Comment: rarely, 4x/year    Drug use: No     Social Drivers of Health      Received from [facility], [facility]    Housing Stability                  Physical Exam     ED Triage Vitals [03/11/25 0933]   /82   Pulse 63   Resp 18   Temp 97.3 °F (36.3 °C)   Temp src    SpO2 99 %   O2 Device None (Room air)       Current Vitals:   Vital Signs  BP: 123/68  Pulse: 59  Resp: 16  Temp: 97.3 °F (36.3 °C)    Oxygen Therapy  SpO2: 100 %  O2 Device: None (Room air)        Physical Exam     General Appearance: This is a middle-aged female sitting on a gurney.  Vital signs were reviewed per nurses notes.  Left lower extremity: Neurovascularly intact with full range of motion.  There is mild diffuse edema and effusion of the left knee.  No collateral ligamentous laxity.  Lachman sign is negative.  Dorsal pedal pulses present.  Skin: No open wounds or skin changes.  ED Course   Labs Reviewed - No data to display         US VENOUS DOPPLER LEG LEFT - DIAG IMG (CPT=93971)    Result Date: 3/11/2025  PROCEDURE:  US VENOUS DOPPLER LEG LEFT - DIAG IMG (CPT=93971)  COMPARISON:  None.  INDICATIONS:  eval for DVT  TECHNIQUE:  Real time, grey scale, and duplex ultrasound was used to evaluate the lower extremity venous system. B-mode two-dimensional images of the vascular structures, Doppler spectral analysis, and color flow.  Doppler imaging were performed.  The following veins were imaged:  Common, deep, and superficial femoral, popliteal, sapheno-femoral junction, posterior tibial veins, and the contralateral common femoral vein.  PATIENT STATED HISTORY: (As transcribed by Technologist)  Patient states she has had left knee pain and swelling since Friday.    FINDINGS:   EXTREMITY EXAMINED:  Left lower extremity. SAPHENOFEMORAL JUNCTION:  No reflux. THROMBI:  None visible. COMPRESSION:  Normal compressibility, phasicity, and augmentation. OTHER:  There is a left-sided Baker's cyst measuring 4.3 x 1 x 2.8 cm.            CONCLUSION:  1. No DVT. 2. There is a 4.3 cm left-sided Baker's cyst.   LOCATION:  [facility]    Dictated by (CST): Stromberg, LeRoy, MD on 3/11/2025 at 10:32 AM     Finalized by (CST): Stromberg, LeRoy, MD on 3/11/2025 at 10:33 AM       XR KNEE (1 OR 2 VIEWS), LEFT (CPT=73560)    Result Date: 3/11/2025  PROCEDURE:  XR KNEE (1 OR 2 VIEWS), LEFT (CPT=73560)  COMPARISON:  None.  INDICATIONS:  left leg swelling and pain since Friday  PATIENT STATED HISTORY: (As transcribed by Technologist)  Patient has left knee swelling since 3-7-25.  She denies any injury. States she works up and does weights.  She also has anterior pain and has pain with movement.    FINDINGS:  BONES:  Normal.  No significant arthropathy or acute abnormality. SOFT TISSUES:  Negative.  No visible soft tissue swelling. EFFUSION:  Small joint effusion OTHER:  Negative.            CONCLUSION:  Small joint effusion   LOCATION:  [id number]   Dictated by (CST): Mo Rainey MD on 3/11/2025 at 10:06 AM     Finalized by (CST): Mo Rainey MD on 3/11/2025 at 10:07 AM       I personally reviewed the images myself and went over results with patient.    I viewed the venous Doppler of the left lower extremity as well as the left knee films myself.  There is a small joint effusion and presence of a Baker's cyst.    Ace wrap was applied to the knee.  Test results and treatment plan were discussed.       MDM      #1.  Left knee pain.  2.  Baker's cyst of the left knee.  Baker's cyst is the likely etiology of the patient's pain and swelling.  Recommended NSAIDs, ice and physician follow-up for orthopedic referral if no improvement within several days time.        MDM    Disposition and Plan     Clinical Impression:  1.  Acute pain of left knee    2. Synovial cyst of left popliteal space         Disposition:  Discharge  3/11/2025 10:53 am    Follow-up:  Charles Santo MD  [address]  [address]  [facility] [address]  [phone]    Call  As needed          Medications Prescribed:  Discharge Medication List as of 3/11/2025 10:56 AM              Supplementary Documentation:

## 2025-03-11 NOTE — DISCHARGE INSTRUCTIONS
Ice for 30 minutes at bedtime.    Ibuprofen 400 every 6 hours as needed for pain.    Physician follow-up this week.

## (undated) DEVICE — MEDI-VAC NON-CONDUCTIVE SUCTION TUBING: Brand: CARDINAL HEALTH

## (undated) DEVICE — MEDI-VAC SUCTION HANDLE REGULAR CAPACITY: Brand: CARDINAL HEALTH

## (undated) DEVICE — 3M™ RED DOT™ MONITORING ELECTRODE WITH FOAM TAPE AND STICKY GEL, 50/BAG, 20/CASE, 72/PLT 2570: Brand: RED DOT™

## (undated) DEVICE — ENDOSCOPY PACK UPPER: Brand: MEDLINE INDUSTRIES, INC.

## (undated) DEVICE — ECHOTIP ULTRA: Brand: ECHOTIP

## (undated) DEVICE — SYRINGE 60ML SLIP TIP

## (undated) DEVICE — 1200CC GUARDIAN II: Brand: GUARDIAN

## (undated) DEVICE — NEEDLE ESCP 8-CM 19GA 1.73MM

## (undated) DEVICE — Device: Brand: DEFENDO AIR/WATER/SUCTION AND BIOPSY VALVE

## (undated) DEVICE — FILTERLINE NASAL ADULT O2/CO2

## (undated) DEVICE — ENDOSCOPY PACK - LOWER: Brand: MEDLINE INDUSTRIES, INC.

## (undated) DEVICE — FORCEP BIOPSY RJ4 LG CAP W/ND

## (undated) NOTE — LETTER
[facility]  Billy Porter 61 [id number] [facility], [address] [place] [address]    Consent for Operation    Date: __________________    Time: _______________    1.  I authorize the performance upon Morgan Levine the following operation:    Procedure(s):  ENDOSCOPIC ULTRAS videotape. [facility] will not be responsible for storage or maintenance of this tape. 6. For the purpose of advancing medical education, I consent to the admittance of observers to the Operating Room.     7. I authorize the use of any specimen, organs Signature of Patient:   ___________________________    When the patient is a minor or mentally incompetent to give consent:  Signature of person authorized to consent for patient: ___________________________   Relationship to patient: _____________________ drugs/illegal medications). Failure to inform my anesthesiologist about these medicines may increase my risk of anesthetic complications. · If I am allergic to anything or have had a reaction to anesthesia before.     3. I understand how the anesthesia med I have discussed the procedure and information above with the patient (or patient’s representative) and answered their questions. The patient or their representative has agreed to have anesthesia services.     _______________________________________________

## (undated) NOTE — LETTER
Date: 2019  Patient Name:  Ariel Dent [facility]             Address: [address]  [address] [facility] [id number]    : 1955      Dear Kyra Jama,      I hope this letter finds you well.      Aside from mild inflammation in the stomach, the biopsie

## (undated) NOTE — LETTER
05/10/24    Patient: Татьяна Leonardo  : 1955 Visit date: 2024    Dear  Charles Santo MD    Thank you for referring Татьяна Leonardo to my practice.  Please find my assessment and plan below.     Assessment   1. Rectal prolapse        This is a very nice 68-year-old female who presents to me with concern for recurrent rectal prolapse.  Patient has a history of rectal prolapse which was repaired by Dr. Heart at [place] and [facility] through a robotic ventral mesh rectopexy.  About a week ago, patient began having diarrhea and was diagnosed with E. coli.  She experienced recurrent prolapse of tissue through her anus associated with pain/discomfort prompting her to go to the emergency room on 2024.  Per evaluation by Dr. Carter in the emergency room, patient had rectal prolapse that was easily reduced.  She was instructed to follow-up with me.    Patient's most recent colonoscopy was in  and she is due for repeat colonoscopy in .  She also has a history of symptomatic hemorrhoids and been treated with rubber band ligation by Dr. Heart.  She tends to have diarrhea at baseline and takes Imodium as needed.  She also notes vaginal prolapse for years that has never been repaired.  She does not have a gynecologist.  She denies any rectal bleeding or pain currently.  She states she is a runner but has been not running lately due to fear of recurrent prolapse.  She denies any fecal incontinence.  She has rare urinary incontinence.  She does not take any blood thinners.  She states she followed up with me rather than Dr. Heart because she lives in [place] and 10 minutes away from my office and would prefer to have her care at [place] if possible.    External exam of the anus is normal.  Digital rectal exam reveals poor tone and squeeze without any masses, bleeding, drainage or tenderness.  Anoscopy reveals healthy appearing and redundant distal rectal mucosa.    Patient refused examination on  the commode after Valsalva maneuver.  There is a large vaginal prolapse of what appears to be bladder prolapsing through the anterior vaginal wall    Plan  It is suspected that the patient has recurrent rectal prolapse.  Patient refused examination on the commode after Valsalva to confirm the diagnosis.  Nonetheless, I strongly recommend the patient follows up with her operative surgeon, Dr. Heart.  Patient expressed understanding and was agreeable to follow-up with Dr. Heart.  She is welcome to follow-up with me as needed.      Sincerely,       Tunde Epps MD   CC:   No Recipients

## (undated) NOTE — LETTER
[place] [address]  [place], [address] [facility]    Consent for Operation  Date: __________________                                Time: _______________    1.  I authorize the performance upon Marck Reed the following operation:  Pro procedure has been videotaped, the surgeon will obtain the original videotape. The hospital will not be responsible for storage or maintenance of this tape.     6. For the purpose of advancing medical education, I consent to the admittance of observers to t STATEMENTS REQUIRING INSERTION OR COMPLETION WERE FILLED IN.     Signature of Patient:   ___________________________    When the patient is a minor or mentally incompetent to give consent:  Signature of person authorized to consent for patient: ____________